# Patient Record
Sex: FEMALE | Race: BLACK OR AFRICAN AMERICAN | Employment: FULL TIME | URBAN - METROPOLITAN AREA
[De-identification: names, ages, dates, MRNs, and addresses within clinical notes are randomized per-mention and may not be internally consistent; named-entity substitution may affect disease eponyms.]

---

## 2017-07-13 PROBLEM — N92.1 MENOMETRORRHAGIA: Status: ACTIVE | Noted: 2017-07-13

## 2018-03-31 ENCOUNTER — HOSPITAL ENCOUNTER (EMERGENCY)
Age: 48
Discharge: HOME | End: 2018-03-31
Payer: COMMERCIAL

## 2018-03-31 VITALS
BODY MASS INDEX: 29.65 KG/M2 | TEMPERATURE: 98.6 F | RESPIRATION RATE: 16 BRPM | SYSTOLIC BLOOD PRESSURE: 114 MMHG | DIASTOLIC BLOOD PRESSURE: 82 MMHG | OXYGEN SATURATION: 98 % | WEIGHT: 195 LBS | HEART RATE: 100 BPM

## 2018-03-31 DIAGNOSIS — J06.9 ACUTE UPPER RESPIRATORY INFECTION: Primary | ICD-10-CM

## 2018-03-31 PROCEDURE — 99212 OFFICE O/P EST SF 10 MIN: CPT

## 2018-03-31 RX ORDER — BENZONATATE 100 MG/1
100 CAPSULE ORAL 3 TIMES DAILY PRN
Qty: 21 CAPSULE | Refills: 0 | Status: SHIPPED | OUTPATIENT
Start: 2018-03-31 | End: 2018-04-07

## 2018-03-31 RX ORDER — FLUTICASONE PROPIONATE 50 MCG
2 SPRAY, SUSPENSION (ML) NASAL DAILY
Qty: 1 BOTTLE | Refills: 0 | Status: SHIPPED | OUTPATIENT
Start: 2018-03-31 | End: 2021-05-17

## 2018-03-31 ASSESSMENT — PAIN DESCRIPTION - LOCATION: LOCATION: HEAD

## 2018-03-31 ASSESSMENT — PAIN DESCRIPTION - DESCRIPTORS: DESCRIPTORS: HEADACHE

## 2018-03-31 ASSESSMENT — PAIN SCALES - GENERAL: PAINLEVEL_OUTOF10: 2

## 2018-05-21 ENCOUNTER — HOSPITAL ENCOUNTER (OUTPATIENT)
Age: 48
Discharge: HOME | End: 2018-05-23
Payer: COMMERCIAL

## 2018-05-21 PROCEDURE — 80053 COMPREHEN METABOLIC PANEL: CPT

## 2018-05-21 PROCEDURE — 84443 ASSAY THYROID STIM HORMONE: CPT

## 2018-05-21 PROCEDURE — 85025 COMPLETE CBC W/AUTO DIFF WBC: CPT

## 2018-05-21 PROCEDURE — 84439 ASSAY OF FREE THYROXINE: CPT

## 2018-05-21 PROCEDURE — 82306 VITAMIN D 25 HYDROXY: CPT

## 2018-05-21 PROCEDURE — 80061 LIPID PANEL: CPT

## 2018-05-22 LAB
ALBUMIN SERPL-MCNC: 4.2 G/DL (ref 3.5–5.2)
ALP BLD-CCNC: 62 U/L (ref 35–104)
ALT SERPL-CCNC: 10 U/L (ref 0–32)
ANION GAP SERPL CALCULATED.3IONS-SCNC: 13 MMOL/L (ref 7–16)
AST SERPL-CCNC: 14 U/L (ref 0–31)
BASOPHILS ABSOLUTE: 0.04 E9/L (ref 0–0.2)
BASOPHILS RELATIVE PERCENT: 0.6 % (ref 0–2)
BILIRUB SERPL-MCNC: 0.3 MG/DL (ref 0–1.2)
BUN BLDV-MCNC: 16 MG/DL (ref 6–20)
CALCIUM SERPL-MCNC: 9.5 MG/DL (ref 8.6–10.2)
CHLORIDE BLD-SCNC: 104 MMOL/L (ref 98–107)
CHOLESTEROL, TOTAL: 125 MG/DL (ref 0–199)
CO2: 22 MMOL/L (ref 22–29)
CREAT SERPL-MCNC: 0.9 MG/DL (ref 0.5–1)
EOSINOPHILS ABSOLUTE: 0.24 E9/L (ref 0.05–0.5)
EOSINOPHILS RELATIVE PERCENT: 3.8 % (ref 0–6)
GFR AFRICAN AMERICAN: >60
GFR NON-AFRICAN AMERICAN: >60 ML/MIN/1.73
GLUCOSE BLD-MCNC: 87 MG/DL (ref 74–109)
HCT VFR BLD CALC: 39.6 % (ref 34–48)
HDLC SERPL-MCNC: 40 MG/DL
HEMOGLOBIN: 11.9 G/DL (ref 11.5–15.5)
IMMATURE GRANULOCYTES #: 0.02 E9/L
IMMATURE GRANULOCYTES %: 0.3 % (ref 0–5)
LDL CHOLESTEROL CALCULATED: 69 MG/DL (ref 0–99)
LYMPHOCYTES ABSOLUTE: 1.4 E9/L (ref 1.5–4)
LYMPHOCYTES RELATIVE PERCENT: 22.2 % (ref 20–42)
MCH RBC QN AUTO: 23.1 PG (ref 26–35)
MCHC RBC AUTO-ENTMCNC: 30.1 % (ref 32–34.5)
MCV RBC AUTO: 76.7 FL (ref 80–99.9)
MONOCYTES ABSOLUTE: 0.44 E9/L (ref 0.1–0.95)
MONOCYTES RELATIVE PERCENT: 7 % (ref 2–12)
NEUTROPHILS ABSOLUTE: 4.16 E9/L (ref 1.8–7.3)
NEUTROPHILS RELATIVE PERCENT: 66.1 % (ref 43–80)
PDW BLD-RTO: 13.2 FL (ref 11.5–15)
PLATELET # BLD: 303 E9/L (ref 130–450)
PMV BLD AUTO: 11.6 FL (ref 7–12)
POTASSIUM SERPL-SCNC: 4.1 MMOL/L (ref 3.5–5)
RBC # BLD: 5.16 E12/L (ref 3.5–5.5)
SODIUM BLD-SCNC: 139 MMOL/L (ref 132–146)
T4 FREE: 1.35 NG/DL (ref 0.93–1.7)
TOTAL PROTEIN: 6.9 G/DL (ref 6.4–8.3)
TRIGL SERPL-MCNC: 80 MG/DL (ref 0–149)
TSH SERPL DL<=0.05 MIU/L-ACNC: 1.96 UIU/ML (ref 0.27–4.2)
VITAMIN D 25-HYDROXY: 19 NG/ML (ref 30–100)
VLDLC SERPL CALC-MCNC: 16 MG/DL
WBC # BLD: 6.3 E9/L (ref 4.5–11.5)

## 2019-02-25 DIAGNOSIS — M25.561 RIGHT KNEE PAIN, UNSPECIFIED CHRONICITY: Primary | ICD-10-CM

## 2019-02-26 ENCOUNTER — OFFICE VISIT (OUTPATIENT)
Dept: ORTHOPEDIC SURGERY | Age: 49
End: 2019-02-26
Payer: COMMERCIAL

## 2019-02-26 VITALS — BODY MASS INDEX: 30.29 KG/M2 | WEIGHT: 193 LBS | TEMPERATURE: 98 F | HEIGHT: 67 IN

## 2019-02-26 DIAGNOSIS — M17.11 PRIMARY OSTEOARTHRITIS OF RIGHT KNEE: Primary | ICD-10-CM

## 2019-02-26 PROCEDURE — 99203 OFFICE O/P NEW LOW 30 MIN: CPT | Performed by: ORTHOPAEDIC SURGERY

## 2019-02-26 PROCEDURE — 20610 DRAIN/INJ JOINT/BURSA W/O US: CPT | Performed by: ORTHOPAEDIC SURGERY

## 2019-02-26 RX ORDER — TRIAMCINOLONE ACETONIDE 40 MG/ML
40 INJECTION, SUSPENSION INTRA-ARTICULAR; INTRAMUSCULAR ONCE
Status: COMPLETED | OUTPATIENT
Start: 2019-02-26 | End: 2019-02-26

## 2019-02-26 RX ADMIN — TRIAMCINOLONE ACETONIDE 40 MG: 40 INJECTION, SUSPENSION INTRA-ARTICULAR; INTRAMUSCULAR at 16:54

## 2019-05-01 ENCOUNTER — TELEPHONE (OUTPATIENT)
Dept: ORTHOPEDIC SURGERY | Age: 49
End: 2019-05-01

## 2019-05-01 RX ORDER — MELOXICAM 15 MG/1
15 TABLET ORAL DAILY
Qty: 30 TABLET | Refills: 3 | Status: SHIPPED
Start: 2019-05-01 | End: 2021-05-17 | Stop reason: SDUPTHER

## 2019-07-26 ENCOUNTER — HOSPITAL ENCOUNTER (OUTPATIENT)
Dept: MAMMOGRAPHY | Age: 49
Discharge: HOME | End: 2019-07-28
Payer: COMMERCIAL

## 2019-07-26 DIAGNOSIS — N64.52 NIPPLE DISCHARGE: ICD-10-CM

## 2019-07-26 PROCEDURE — G0279 TOMOSYNTHESIS, MAMMO: HCPCS

## 2019-07-26 PROCEDURE — 77066 DX MAMMO INCL CAD BI: CPT

## 2019-08-22 ENCOUNTER — HOSPITAL ENCOUNTER (OUTPATIENT)
Age: 49
Discharge: HOME | End: 2019-08-24
Payer: COMMERCIAL

## 2019-08-22 LAB
BASOPHILS ABSOLUTE: 0.02 E9/L (ref 0–0.2)
BASOPHILS RELATIVE PERCENT: 0.4 % (ref 0–2)
EOSINOPHILS ABSOLUTE: 0.23 E9/L (ref 0.05–0.5)
EOSINOPHILS RELATIVE PERCENT: 4.7 % (ref 0–6)
HCT VFR BLD CALC: 42.1 % (ref 34–48)
HEMOGLOBIN: 12.3 G/DL (ref 11.5–15.5)
IMMATURE GRANULOCYTES #: 0.01 E9/L
IMMATURE GRANULOCYTES %: 0.2 % (ref 0–5)
LYMPHOCYTES ABSOLUTE: 1.19 E9/L (ref 1.5–4)
LYMPHOCYTES RELATIVE PERCENT: 24.3 % (ref 20–42)
MCH RBC QN AUTO: 23.3 PG (ref 26–35)
MCHC RBC AUTO-ENTMCNC: 29.2 % (ref 32–34.5)
MCV RBC AUTO: 79.9 FL (ref 80–99.9)
MONOCYTES ABSOLUTE: 0.43 E9/L (ref 0.1–0.95)
MONOCYTES RELATIVE PERCENT: 8.8 % (ref 2–12)
NEUTROPHILS ABSOLUTE: 3.02 E9/L (ref 1.8–7.3)
NEUTROPHILS RELATIVE PERCENT: 61.6 % (ref 43–80)
PDW BLD-RTO: 12.8 FL (ref 11.5–15)
PLATELET # BLD: 317 E9/L (ref 130–450)
PMV BLD AUTO: 11.6 FL (ref 7–12)
RBC # BLD: 5.27 E12/L (ref 3.5–5.5)
WBC # BLD: 4.9 E9/L (ref 4.5–11.5)

## 2019-08-22 PROCEDURE — 84443 ASSAY THYROID STIM HORMONE: CPT

## 2019-08-22 PROCEDURE — 80061 LIPID PANEL: CPT

## 2019-08-22 PROCEDURE — 85025 COMPLETE CBC W/AUTO DIFF WBC: CPT

## 2019-08-22 PROCEDURE — 84439 ASSAY OF FREE THYROXINE: CPT

## 2019-08-22 PROCEDURE — 80053 COMPREHEN METABOLIC PANEL: CPT

## 2019-08-23 LAB
ALBUMIN SERPL-MCNC: 4.2 G/DL (ref 3.5–5.2)
ALP BLD-CCNC: 76 U/L (ref 35–104)
ALT SERPL-CCNC: 10 U/L (ref 0–32)
ANION GAP SERPL CALCULATED.3IONS-SCNC: 12 MMOL/L (ref 7–16)
AST SERPL-CCNC: 16 U/L (ref 0–31)
BILIRUB SERPL-MCNC: 0.3 MG/DL (ref 0–1.2)
BUN BLDV-MCNC: 13 MG/DL (ref 6–20)
CALCIUM SERPL-MCNC: 9.1 MG/DL (ref 8.6–10.2)
CHLORIDE BLD-SCNC: 105 MMOL/L (ref 98–107)
CHOLESTEROL, TOTAL: 144 MG/DL (ref 0–199)
CO2: 21 MMOL/L (ref 22–29)
CREAT SERPL-MCNC: 1 MG/DL (ref 0.5–1)
GFR AFRICAN AMERICAN: >60
GFR NON-AFRICAN AMERICAN: >60 ML/MIN/1.73
GLUCOSE BLD-MCNC: 90 MG/DL (ref 74–99)
HDLC SERPL-MCNC: 37 MG/DL
LDL CHOLESTEROL CALCULATED: 84 MG/DL (ref 0–99)
POTASSIUM SERPL-SCNC: 4.3 MMOL/L (ref 3.5–5)
SODIUM BLD-SCNC: 138 MMOL/L (ref 132–146)
T4 FREE: 1.48 NG/DL (ref 0.93–1.7)
TOTAL PROTEIN: 7.2 G/DL (ref 6.4–8.3)
TRIGL SERPL-MCNC: 113 MG/DL (ref 0–149)
TSH SERPL DL<=0.05 MIU/L-ACNC: 1.05 UIU/ML (ref 0.27–4.2)
VLDLC SERPL CALC-MCNC: 23 MG/DL

## 2020-01-17 ENCOUNTER — HOSPITAL ENCOUNTER (EMERGENCY)
Age: 50
Discharge: HOME | End: 2020-01-17
Payer: COMMERCIAL

## 2020-01-17 ENCOUNTER — APPOINTMENT (OUTPATIENT)
Dept: GENERAL RADIOLOGY | Age: 50
End: 2020-01-17
Payer: COMMERCIAL

## 2020-01-17 VITALS
HEART RATE: 68 BPM | WEIGHT: 200 LBS | DIASTOLIC BLOOD PRESSURE: 93 MMHG | TEMPERATURE: 98.1 F | SYSTOLIC BLOOD PRESSURE: 136 MMHG | RESPIRATION RATE: 16 BRPM | BODY MASS INDEX: 31.32 KG/M2 | OXYGEN SATURATION: 98 %

## 2020-01-17 PROCEDURE — 73590 X-RAY EXAM OF LOWER LEG: CPT

## 2020-01-17 PROCEDURE — L4350 ANKLE CONTROL ORTHO PRE OTS: HCPCS

## 2020-01-17 PROCEDURE — 99212 OFFICE O/P EST SF 10 MIN: CPT

## 2020-01-17 RX ORDER — IBUPROFEN 800 MG/1
800 TABLET ORAL EVERY 8 HOURS PRN
Qty: 21 TABLET | Refills: 0 | Status: SHIPPED | OUTPATIENT
Start: 2020-01-17 | End: 2021-05-17

## 2020-01-17 RX ORDER — OMEPRAZOLE 20 MG/1
20 CAPSULE, DELAYED RELEASE ORAL DAILY
COMMUNITY
End: 2021-05-17

## 2020-01-17 ASSESSMENT — PAIN DESCRIPTION - ORIENTATION: ORIENTATION: RIGHT

## 2020-01-17 ASSESSMENT — PAIN DESCRIPTION - LOCATION: LOCATION: ANKLE

## 2020-01-17 ASSESSMENT — PAIN SCALES - GENERAL: PAINLEVEL_OUTOF10: 6

## 2020-01-17 NOTE — ED PROVIDER NOTES
HPI: Lisandra Been 44 y.o. female with a past medical history of   Past Medical History:   Diagnosis Date    Arthritis     GERD (gastroesophageal reflux disease)     Hiatal hernia      presents status post right ankle injury. The patient states that the injury happened acutely. The history is obtained from the patient.  The mechanism of injury is apparent and was a result of missing a step and falling. Patient describes the pain as aching and pressure. Patient states the pain worsens on ambulation and with any weightbearing. Patient denies any distal neurologic symptoms including numbness, tingling, paralysis or coolness of the foot. No other reported injuries or other extremity tenderness or injuries.      Review of Systems:   Pertinent positives and negatives are stated within HPI, all other systems reviewed and are negative.             --------------------------------------------- PAST HISTORY ---------------------------------------------  Past Medical History:  has a past medical history of Arthritis, GERD (gastroesophageal reflux disease), and Hiatal hernia. Past Surgical History:  has a past surgical history that includes hernia repair; Cholecystectomy; Colonoscopy; Endoscopy, colon, diagnostic; and other surgical history (07/12/2017). Social History:  reports that she has never smoked. She does not have any smokeless tobacco history on file. She reports current alcohol use. She reports that she does not use drugs. Family History: family history is not on file. The patients home medications have been reviewed. Allergies: Patient has no known allergies. Physical exam:  Constitutional: The patient is comfortable, well appearing, non toxic. The patient is alert and oriented and conversant.     Head: The head is atraumatic and normocephalic.     Eyes: No discharge is present from the eyes.  The sclera are normal.     ENT: The oropharynx is normal. The mouth is normal to inspection.     Neck: Normal range of motion is achieved in the neck. .     Respiratory/chest: The chest is nontender. Breath sounds are normal. There is no respiratory distress.     Cardiovascular: Heart shows a regular rate and rhythm without murmurs, rubs or gallops.     Lower extremity exam: There is no obvious compartment syndrome. The knee evaluation shows that both knees have no deformity, no swelling, and no proximal fibular head tenderness. . The ankle evaluation shows normal tendon function, capillary refill less than 2 seconds, distal motor function which is intact, distal sensory function which is intact. The achilies tendon is intact. There is localized swelling and tenderness noted of the ankle along the lateral and medial malleolus. The foot evaluation shows no tenderness at the base of the fifth metatarsal. There are no lacerations or evidence of open fracture.      ------------------------- NURSING NOTES AND VITALS REVIEWED ---------------------------   The nursing notes within the ED encounter and vital signs as below have been reviewed by myself. BP (!) 136/93   Pulse 68   Temp 98.1 °F (36.7 °C) (Oral)   Resp 16   Wt 200 lb (90.7 kg)   LMP 07/07/2017   SpO2 98%   BMI 31.32 kg/m²   Oxygen Saturation Interpretation: Normal    The patients available past medical records and past encounters were reviewed. -------------------------------------------------- RESULTS -------------------------------------------------  I have personally reviewed all laboratory and imaging results for this patient. Results are listed below. LABS:  No results found for this visit on 01/17/20.     RADIOLOGY:  Interpreted by Radiologist.  XR TIBIA FIBULA RIGHT (2 VIEWS)   Final Result   No acute osseous involvement              ------------------------------ ED COURSE/MEDICAL DECISION MAKING----------------------  Medications - No data to display      ED COURSE:       Medical decision making: right ankle

## 2020-07-27 ENCOUNTER — OFFICE VISIT (OUTPATIENT)
Dept: ORTHOPEDIC SURGERY | Age: 50
End: 2020-07-27
Payer: COMMERCIAL

## 2020-07-27 VITALS — BODY MASS INDEX: 31.08 KG/M2 | WEIGHT: 198 LBS | HEIGHT: 67 IN | TEMPERATURE: 98 F

## 2020-07-27 PROCEDURE — 99213 OFFICE O/P EST LOW 20 MIN: CPT | Performed by: ORTHOPAEDIC SURGERY

## 2020-07-27 PROCEDURE — 20610 DRAIN/INJ JOINT/BURSA W/O US: CPT | Performed by: ORTHOPAEDIC SURGERY

## 2020-07-27 NOTE — PROGRESS NOTES
Chief Complaint   Patient presents with    Knee Pain     Right Knee, F/U       Subjective:     Patient ID: Kelsie Clemens is a 44 y.o. .  female    Knee Pain  Patient complains of right knee pain. This is evaluated as a personal injury. There was not a history of injury. The pain began 5 years ago. The pain is located medial. She describes  Her symptoms as aching. She has not experienced popping, clicking, locking, and giving way in the affected knee. The patient has had pain with kneeling, squating, and climbing stairs. Symptoms improve with rest. The symptoms are worse with activity. The knee has not given out or felt unstable. The patient cannot bend and straighten the knee fully. The patient is active in none. Treatment to date has been ice, heat, Tylenol, NSAID's, viscosupplementation, without significant relief. She had csi with good relief for a couple monthsThe patient is working. 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      Past Medical History:   Diagnosis Date    Arthritis     GERD (gastroesophageal reflux disease)     Hiatal hernia      Past Surgical History:   Procedure Laterality Date    CHOLECYSTECTOMY      COLONOSCOPY      ENDOSCOPY, COLON, DIAGNOSTIC      HERNIA REPAIR      OTHER SURGICAL HISTORY  07/12/2017    laparscopic total hysterectomy robotic assissted with bilateral salpingectomy and cystoscopy       Current Outpatient Medications:     omeprazole (PRILOSEC) 20 MG delayed release capsule, Take 20 mg by mouth daily, Disp: , Rfl:     fluticasone (FLONASE) 50 MCG/ACT nasal spray, 2 sprays by Nasal route daily, Disp: 1 Bottle, Rfl: 0    ibuprofen (IBU) 800 MG tablet, Take 1 tablet by mouth every 8 hours as needed for Pain, Disp: 21 tablet, Rfl: 0    meloxicam (MOBIC) 15 MG tablet, Take 1 tablet by mouth daily, Disp: 30 tablet, Rfl: 3  No Known Allergies  Social History     Socioeconomic History    Marital status:      Spouse name: Not on file    Number of children: Not on file    Years of education: Not on file    Highest education level: Not on file   Occupational History    Not on file   Social Needs    Financial resource strain: Not on file    Food insecurity     Worry: Not on file     Inability: Not on file    Transportation needs     Medical: Not on file     Non-medical: Not on file   Tobacco Use    Smoking status: Never Smoker   Substance and Sexual Activity    Alcohol use: Yes     Comment: occasional     Drug use: No    Sexual activity: Not on file   Lifestyle    Physical activity     Days per week: Not on file     Minutes per session: Not on file    Stress: Not on file   Relationships    Social connections     Talks on phone: Not on file     Gets together: Not on file     Attends Jewish service: Not on file     Active member of club or organization: Not on file     Attends meetings of clubs or organizations: Not on file     Relationship status: Not on file    Intimate partner violence     Fear of current or ex partner: Not on file     Emotionally abused: Not on file     Physically abused: Not on file     Forced sexual activity: Not on file   Other Topics Concern    Not on file   Social History Narrative    Not on file     No family history on file. REVIEW OF SYSTEMS:     General/Constitution:  (-)weight loss, (-)fever, (-)chills, (-)weakness. Skin: (-) rash,(-) psoriasis,(-) eczema, (-)skin cancer. Musculoskeletal: (-) fractures,  (-) dislocations,(-) collagen vascular disease, (-) fibromyalgia, (-) multiple sclerosis, (-) muscular dystrophy, (-) RSD,(-) joint pain (-)swelling, (-) joint pain,swelling. Neurologic: (-) epilepsy, (-)seizures,(-) brain tumor,(-) TIA, (-)stroke, (-)headaches, (-)Parkinson disease,(-) memory loss, (-) LOC. Cardiovascular: (-) Chest pain, (-) swelling in legs/feet, (-) SOB, (-) cramping in legs/feet with walking. Respiratory: (-) SOB, (-) Coughing, (-) night sweats.   GI: (-) nausea, (-) vomiting, (-) diarrhea, (-) blood in stool, (-) gastric ulcer. Psychiatric: (-) Depression, (-) Anxiety, (-) bipolar disease, (-) Alzheimer's Disease  Allergic/Immunologic: (-) allergies latex, (-) allergies metal, (-) skin sensitivity. Hematlogic: (-) anemia, (-) blood transfusion, (-) DVT/PE, (-) Clotting disorders    Subjective:    Constitution:  @VS    Psycihatric:  The patient is alert and oriented x 3, appears to be stated age and in no distress. Respiratory:  Respiratory effort is not labored. Patient is not gasping. Palpation of the chest reveals no tactile fremitus. Skin:  Upon inspection: the skin appears warm, dry and intact. There is  a previous scar over the affected area. There is any cellulitis, lymphedema or cutaneous lesions noted in the lower extremities. Upon palpation there is no induration noted. Neurologic:  Gait: antalgic; Motor exam of the lower extremities show ; quadriceps, hamstrings, foot dorsi and plantar flexors intact R.  5/5 and L. 5/5. Deep tendon reflexes are 2/4 at the knees and 2/4 at the ankles with strong extensor hallicus longus motor strength bilaterally. Sensory to both feet is intact to all sensory roots. Cardiovascular: The vascular exam is normal and is well perfused to distal extremities. Distal pulses DP/PT: R. 2+; L. 2+. There is cap refill noted less than two seconds in all digits. There is not edema of the bilateral lower extremities. There is not varicosities noted in the distal extremities. Lymph:  Upon palpation,  there is no lymphadenopathy noted in bilateral lower extremities. Musculoskeletal:  Gait: antalgic; examination of the nails and digits reveal no cyanosis or clubbing. Lumbar exam:  On visual inspection, there is not deformity of the spine. full range of motion, no tenderness, palpable spasm or pain on motion. Special tests: Straight Leg Raise negative, Leila test negative.       Hip exam:   Upon inspection, there is not deformity noted. Upon palpation there is not tenderness. ROM: is  full and symmetrical.   Strength: Hip Flexors 5/5; Hip Abductors 5/5; Hip Adduction 5/5. Knee exam:  Right knee exam shows;  range of motion of R. Knee is 0 to 120, and L. Knee is 0 to 120. The patient does have  pain on motion, effusion is mild, there is tenderness over the  medial region, there are not any masses, there is not ligamentous instability, there is  deformity noted. Knee exam: right positive for moderate crepitations, some mild tenderness laxity is not noted with  stress. There is not a popliteal cyst.    R. Knee:  Lachman's negative, Anterior Drawer negative, Posterior Drawer negative  Gama's negative, Thallasy  negative,   PF grind test negative, Apprehension test negative, Patellar J sign  negative  L. Knee:  Lachman's negative, Anterior Drawer negative, Posterior Drawer negative  Gama's negative, Thallasy  negative,   PF grind test negative, Apprehension test negative,  Patellar J sign  negative    Xray Exam:  PA and AP standing, with lateral and tangent views of the knee fail to   reveal any evidence of fracture, dislocation or pathology of the bones   of the knee.  The bones are of normal density. Complete loss of the   medial compartment of the right knee joint space with subchondral   sclerosis and spurring. Slight varus deformity. .       Radiographic findings reviewed with patient    Assessment:  Encounter Diagnoses   Name Primary?  Primary osteoarthritis of right knee Yes       Plan:  I had a lengthy discussion with the patient regarding their diagnosis. I explained treatment options including surgical vs non surgical treatment. I reviewed in detail the risks and benefits and outlined the procedure in detail with expected outcomes and possible complications. I also discussed non surgical treatment such as injections (CSI and visco supplementation), physical therapy, topical creams and NSAID's.  They have

## 2020-07-29 RX ORDER — TRIAMCINOLONE ACETONIDE 40 MG/ML
40 INJECTION, SUSPENSION INTRA-ARTICULAR; INTRAMUSCULAR ONCE
Status: COMPLETED | OUTPATIENT
Start: 2020-07-29 | End: 2020-07-29

## 2020-07-29 RX ADMIN — TRIAMCINOLONE ACETONIDE 40 MG: 40 INJECTION, SUSPENSION INTRA-ARTICULAR; INTRAMUSCULAR at 15:04

## 2021-05-14 DIAGNOSIS — M17.11 PRIMARY OSTEOARTHRITIS OF RIGHT KNEE: Primary | ICD-10-CM

## 2021-05-17 ENCOUNTER — OFFICE VISIT (OUTPATIENT)
Dept: ORTHOPEDIC SURGERY | Age: 51
End: 2021-05-17
Payer: COMMERCIAL

## 2021-05-17 VITALS — HEIGHT: 67 IN | BODY MASS INDEX: 31.08 KG/M2 | WEIGHT: 198 LBS

## 2021-05-17 DIAGNOSIS — M17.11 PRIMARY OSTEOARTHRITIS OF RIGHT KNEE: Primary | ICD-10-CM

## 2021-05-17 PROCEDURE — 20610 DRAIN/INJ JOINT/BURSA W/O US: CPT | Performed by: ORTHOPAEDIC SURGERY

## 2021-05-17 PROCEDURE — 99213 OFFICE O/P EST LOW 20 MIN: CPT | Performed by: ORTHOPAEDIC SURGERY

## 2021-05-17 RX ORDER — TRIAMCINOLONE ACETONIDE 40 MG/ML
40 INJECTION, SUSPENSION INTRA-ARTICULAR; INTRAMUSCULAR ONCE
Status: COMPLETED | OUTPATIENT
Start: 2021-05-17 | End: 2021-05-17

## 2021-05-17 RX ORDER — MELOXICAM 15 MG/1
15 TABLET ORAL DAILY
Qty: 30 TABLET | Refills: 3 | Status: ON HOLD
Start: 2021-05-17 | End: 2022-05-26 | Stop reason: HOSPADM

## 2021-05-17 RX ADMIN — TRIAMCINOLONE ACETONIDE 40 MG: 40 INJECTION, SUSPENSION INTRA-ARTICULAR; INTRAMUSCULAR at 12:34

## 2021-05-17 NOTE — PROGRESS NOTES
Chief Complaint   Patient presents with    Knee Pain     Right knee pain follow up. Anh Osorio returns today for follow-up of her right knee pain. she reports this is worse than when I saw her last.  The patient's pain level is a 7/10. The previous treatment was not successful. Past Medical History:   Diagnosis Date    Arthritis     GERD (gastroesophageal reflux disease)     Hiatal hernia      Past Surgical History:   Procedure Laterality Date    CHOLECYSTECTOMY      COLONOSCOPY      ENDOSCOPY, COLON, DIAGNOSTIC      HERNIA REPAIR      OTHER SURGICAL HISTORY  07/12/2017    laparscopic total hysterectomy robotic assissted with bilateral salpingectomy and cystoscopy       Current Outpatient Medications:     meloxicam (MOBIC) 15 MG tablet, Take 1 tablet by mouth daily, Disp: 30 tablet, Rfl: 3  No Known Allergies  Social History     Socioeconomic History    Marital status:      Spouse name: Not on file    Number of children: Not on file    Years of education: Not on file    Highest education level: Not on file   Occupational History    Not on file   Tobacco Use    Smoking status: Never Smoker   Substance and Sexual Activity    Alcohol use: Yes     Comment: occasional     Drug use: No    Sexual activity: Not on file   Other Topics Concern    Not on file   Social History Narrative    Not on file     Social Determinants of Health     Financial Resource Strain:     Difficulty of Paying Living Expenses:    Food Insecurity:     Worried About Running Out of Food in the Last Year:     Virgil of Food in the Last Year:    Transportation Needs:     Lack of Transportation (Medical):      Lack of Transportation (Non-Medical):    Physical Activity:     Days of Exercise per Week:     Minutes of Exercise per Session:    Stress:     Feeling of Stress :    Social Connections:     Frequency of Communication with Friends and Family:     Frequency of Social Gatherings with Friends and Family:     Attends Taoist Services:     Active Member of Clubs or Organizations:     Attends Club or Organization Meetings:     Marital Status:    Intimate Partner Violence:     Fear of Current or Ex-Partner:     Emotionally Abused:     Physically Abused:     Sexually Abused:      No family history on file. Review of Systems:     Skin: (-) rash,(-) psoriasis,(-) eczema, (-)skin cancer. Musculoskeletal: (-) fractures,  (-) dislocations,(-) collagen vascular disease, (-) fibromyalgia, (-) multiple sclerosis, (-) muscular dystrophy, (-) RSD,(-) joint pain (-)swelling, (-) joint pain,swelling. Neurologic: (-) epilepsy, (-)seizures,(-) brain tumor,(-) TIA, (-)stroke, (-)headaches, (-)Parkinson disease,(-) memory loss, (-) LOC. Cardiovascular: (-) Chest pain, (-) swelling in legs/feet, (-) SOB, (-) cramping in legs/feet with walking. Constitutional:  The patient is alert and oriented x 3, appears to be stated age and in no distress. Ht 5' 7\" (1.702 m)   Wt 198 lb (89.8 kg)   LMP 07/07/2017   BMI 31.01 kg/m²     Skin:  Upon inspection: the skin appears warm, dry and intact. There is not a previous scar over the affected area. There is not any cellulitis, lymphedema or cutaneous lesions noted in the lower extremities. Upon palpation there is no induration noted. Neurologic:  Gait: normal;  Motor exam of the lower extremities show ; quadriceps, hamstrings, foot dorsi and plantar flexors intact R.  5/5 and L. 5/5. Deep tendon reflexes are 2/4 at the knees and 2/4 at the ankles with strong extensor hallicus longus motor strength bilaterally. Sensory to both feet is intact to all sensory roots. Cardiovascular: The vascular exam is normal and is well perfused to distal extremities. Distal pulses DP/PT: R. 2+; L. 2+. There is cap refill noted less than two seconds in all digits. There is not edema of the bilateral lower extremities. There is not varicosities noted in the distal extremities. explained treatment options including surgical vs non surgical treatment. I reviewed in detail the risks and benefits and outlined the procedure in detail with expected outcomes and possible complications. I also discussed non surgical treatment such as injections (CSI and visco supplementation), physical therapy, topical creams and NSAID's. They have elected for conservative management at this time. I will proceed with a cortisone injection in the Right knee. Verbal and written consent was obtained for the injections. The skin was prepped with alcohol. 1mL of Kenalog 40mg and 9mL of 0.25% Marcaine was  injected to Right knee. The injection was given through the lateral side of the knee. The patient tolerated the injection well. I will see the patient back after zilretta approval   The patient has failed conservative measures such as NSAIDS, HEP, and cortisone injections. She is an excellent candidate for Zilretta injections  in the Right knee.  We will contact the patient's insurance company and see them back in the office once we have received approval.

## 2021-06-01 ENCOUNTER — TELEPHONE (OUTPATIENT)
Dept: ORTHOPEDIC SURGERY | Age: 51
End: 2021-06-01

## 2021-07-14 ENCOUNTER — CLINICAL DOCUMENTATION (OUTPATIENT)
Dept: GENERAL RADIOLOGY | Age: 51
End: 2021-07-14

## 2021-09-22 ENCOUNTER — HOSPITAL ENCOUNTER (OUTPATIENT)
Dept: GENERAL RADIOLOGY | Age: 51
Discharge: HOME | End: 2021-09-24
Payer: COMMERCIAL

## 2021-09-22 DIAGNOSIS — Z12.31 VISIT FOR SCREENING MAMMOGRAM: ICD-10-CM

## 2021-09-22 PROCEDURE — 77063 BREAST TOMOSYNTHESIS BI: CPT

## 2021-09-30 ENCOUNTER — NURSE ONLY (OUTPATIENT)
Dept: ORTHOPEDIC SURGERY | Age: 51
End: 2021-09-30
Payer: COMMERCIAL

## 2021-09-30 DIAGNOSIS — M17.11 PRIMARY OSTEOARTHRITIS OF RIGHT KNEE: Primary | ICD-10-CM

## 2021-09-30 PROCEDURE — 20610 DRAIN/INJ JOINT/BURSA W/O US: CPT | Performed by: NURSE PRACTITIONER

## 2021-09-30 PROCEDURE — 99999 PR OFFICE/OUTPT VISIT,PROCEDURE ONLY: CPT | Performed by: NURSE PRACTITIONER

## 2021-09-30 NOTE — PROGRESS NOTES
Chief Complaint   Patient presents with    Injections     right knee zilretta        I will proceed with a cortisone injection in the Right knee. Verbal and written consent was obtained for the injections. The skin was prepped with alcohol. A prepared mixture of 32 mg of Zilretta and 5mL diluent was injected to Right knee. The injection was given through the lateral side of the knee. The patient tolerated the injection well. I will see the patient back prn. Ruba Villagran was seen today for injections.     Diagnoses and all orders for this visit:    Primary osteoarthritis of right knee  -     20610 - OR DRAIN/INJECT LARGE JOINT/BURSA    Other orders  -     triamcinolone acetonide (ZILRETTA) intra-articular injection 32 mg

## 2021-10-28 ENCOUNTER — OFFICE VISIT (OUTPATIENT)
Dept: ORTHOPEDIC SURGERY | Age: 51
End: 2021-10-28
Payer: COMMERCIAL

## 2021-10-28 VITALS — TEMPERATURE: 98 F | HEIGHT: 67 IN | WEIGHT: 198 LBS | BODY MASS INDEX: 31.08 KG/M2

## 2021-10-28 DIAGNOSIS — M17.11 PRIMARY OSTEOARTHRITIS OF RIGHT KNEE: Primary | ICD-10-CM

## 2021-10-28 PROCEDURE — 99212 OFFICE O/P EST SF 10 MIN: CPT | Performed by: NURSE PRACTITIONER

## 2021-10-28 RX ORDER — ERGOCALCIFEROL 1.25 MG/1
CAPSULE ORAL
COMMUNITY
Start: 2021-10-18

## 2021-10-28 NOTE — PROGRESS NOTES
Stress:     Feeling of Stress :    Social Connections:     Frequency of Communication with Friends and Family:     Frequency of Social Gatherings with Friends and Family:     Attends Advent Services:     Active Member of Clubs or Organizations:     Attends Club or Organization Meetings:     Marital Status:    Intimate Partner Violence:     Fear of Current or Ex-Partner:     Emotionally Abused:     Physically Abused:     Sexually Abused:      No family history on file. Review of Systems:     Skin: (-) rash,(-) psoriasis,(-) eczema, (-)skin cancer. Musculoskeletal: (-) fractures,  (-) dislocations,(-) collagen vascular disease, (-) fibromyalgia, (-) multiple sclerosis, (-) muscular dystrophy, (-) RSD,(-) joint pain (-)swelling, (-) joint pain,swelling. Neurologic: (-) epilepsy, (-)seizures,(-) brain tumor,(-) TIA, (-)stroke, (-)headaches, (-)Parkinson disease,(-) memory loss, (-) LOC. Cardiovascular: (-) Chest pain, (-) swelling in legs/feet, (-) SOB, (-) cramping in legs/feet with walking. Constitutional:  The patient is alert and oriented x 3, appears to be stated age and in no distress. Temp 98 °F (36.7 °C)   Ht 5' 7\" (1.702 m)   Wt 198 lb (89.8 kg)   LMP 07/07/2017   BMI 31.01 kg/m²     Skin:  Upon inspection: the skin appears warm, dry and intact. There is not a previous scar over the affected area. There is not any cellulitis, lymphedema or cutaneous lesions noted in the lower extremities. Upon palpation there is no induration noted. Neurologic:  Gait: normal;  Motor exam of the lower extremities show ; quadriceps, hamstrings, foot dorsi and plantar flexors intact R.  5/5 and L. 5/5. Deep tendon reflexes are 2/4 at the knees and 2/4 at the ankles with strong extensor hallicus longus motor strength bilaterally. Sensory to both feet is intact to all sensory roots. Cardiovascular: The vascular exam is normal and is well perfused to distal extremities.   Distal pulses DP/PT: compression, and elevation (RICE) therapy. Reduction in offending activity. I had a lengthy discussion with the patient regarding their diagnosis. I explained treatment options including surgical vs non surgical treatment. I reviewed in detail the risks and benefits and outlined the procedure in detail with expected outcomes and possible complications. I also discussed non surgical treatment such as injections (CSI and visco supplementation), physical therapy, topical creams and NSAID's. They have elected for conservative management at this time.    Fu prn

## 2022-04-05 ENCOUNTER — OFFICE VISIT (OUTPATIENT)
Dept: ORTHOPEDIC SURGERY | Age: 52
End: 2022-04-05
Payer: COMMERCIAL

## 2022-04-05 VITALS — WEIGHT: 195 LBS | HEIGHT: 67 IN | BODY MASS INDEX: 30.61 KG/M2

## 2022-04-05 DIAGNOSIS — M17.11 PRIMARY OSTEOARTHRITIS OF RIGHT KNEE: Primary | ICD-10-CM

## 2022-04-05 PROCEDURE — 99214 OFFICE O/P EST MOD 30 MIN: CPT | Performed by: ORTHOPAEDIC SURGERY

## 2022-04-05 NOTE — PROGRESS NOTES
Chief Complaint   Patient presents with    Knee Pain     Right knee pain follow up. Nadia Howard returns today for follow-up of her right knee pain. she reports this is worse than when I saw her last.  The patient's pain level is a 7/10. The previous treatment was successful. Past Medical History:   Diagnosis Date    Arthritis     GERD (gastroesophageal reflux disease)     Hiatal hernia      Past Surgical History:   Procedure Laterality Date    CHOLECYSTECTOMY      COLONOSCOPY      ENDOSCOPY, COLON, DIAGNOSTIC      HERNIA REPAIR      OTHER SURGICAL HISTORY  07/12/2017    laparscopic total hysterectomy robotic assissted with bilateral salpingectomy and cystoscopy       Current Outpatient Medications:     vitamin D (ERGOCALCIFEROL) 1.25 MG (98445 UT) CAPS capsule, , Disp: , Rfl:     meloxicam (MOBIC) 15 MG tablet, Take 1 tablet by mouth daily, Disp: 30 tablet, Rfl: 3  No Known Allergies  Social History     Socioeconomic History    Marital status:      Spouse name: Not on file    Number of children: Not on file    Years of education: Not on file    Highest education level: Not on file   Occupational History    Not on file   Tobacco Use    Smoking status: Never Smoker    Smokeless tobacco: Not on file   Substance and Sexual Activity    Alcohol use: Yes     Comment: occasional     Drug use: No    Sexual activity: Not on file   Other Topics Concern    Not on file   Social History Narrative    Not on file     Social Determinants of Health     Financial Resource Strain:     Difficulty of Paying Living Expenses: Not on file   Food Insecurity:     Worried About Running Out of Food in the Last Year: Not on file    Virgil of Food in the Last Year: Not on file   Transportation Needs:     Lack of Transportation (Medical): Not on file    Lack of Transportation (Non-Medical):  Not on file   Physical Activity:     Days of Exercise per Week: Not on file    Minutes of Exercise per Session: Not on file   Stress:     Feeling of Stress : Not on file   Social Connections:     Frequency of Communication with Friends and Family: Not on file    Frequency of Social Gatherings with Friends and Family: Not on file    Attends Christianity Services: Not on file    Active Member of Clubs or Organizations: Not on file    Attends Club or Organization Meetings: Not on file    Marital Status: Not on file   Intimate Partner Violence:     Fear of Current or Ex-Partner: Not on file    Emotionally Abused: Not on file    Physically Abused: Not on file    Sexually Abused: Not on file   Housing Stability:     Unable to Pay for Housing in the Last Year: Not on file    Number of [place] in the Last Year: Not on file    Unstable Housing in the Last Year: Not on file     No family history on file. Review of Systems:     Skin: (-) rash,(-) psoriasis,(-) eczema, (-)skin cancer. Musculoskeletal: (-) fractures,  (-) dislocations,(-) collagen vascular disease, (-) fibromyalgia, (-) multiple sclerosis, (-) muscular dystrophy, (-) RSD,(-) joint pain (-)swelling, (-) joint pain,swelling. Neurologic: (-) epilepsy, (-)seizures,(-) brain tumor,(-) TIA, (-)stroke, (-)headaches, (-)Parkinson disease,(-) memory loss, (-) LOC. Cardiovascular: (-) Chest pain, (-) swelling in legs/feet, (-) SOB, (-) cramping in legs/feet with walking. Constitutional:  The patient is alert and oriented x 3, appears to be stated age and in no distress. Ht 5' 7\" (1.702 m)   Wt 195 lb (88.5 kg)   LMP 07/07/2017   BMI 30.54 kg/m²     Skin:  Upon inspection: the skin appears warm, dry and intact. There is not a previous scar over the affected area. There is not any cellulitis, lymphedema or cutaneous lesions noted in the lower extremities. Upon palpation there is no induration noted.       Neurologic:  Gait: normal;  Motor exam of the lower extremities show ; quadriceps, hamstrings, foot dorsi and plantar flexors intact R.  5/5 and L. 5/5. Deep tendon reflexes are 2/4 at the knees and 2/4 at the ankles with strong extensor hallicus longus motor strength bilaterally. Sensory to both feet is intact to all sensory roots. Cardiovascular: The vascular exam is normal and is well perfused to distal extremities. Distal pulses DP/PT: R. 2+; L. 2+. There is cap refill noted less than two seconds in all digits. There is not edema of the bilateral lower extremities. There is not varicosities noted in the distal extremities. Lymph:  Upon palpation,  there is no lymphadenopathy noted in bilateral lower extremities. Musculoskeletal:  Gait: normal; examination of the nails and digits reveal no cyanosis or clubbing    Lumbar exam:  On visual inspection, there is no deformity of the spine. full range of motion, no tenderness, palpable spasm or pain on motion. Special tests: Straight Leg Raise negative, Leila testnegative. Hip exam:  Upon inspection, there is no deformity noted. Upon palpation there is not tenderness. ROM: is   full and semetrical.   Strength: Hip Flexors 5/5; Hip Abductors 5/5; Hip Adduction 5/5. Knee exam:  Right knee exam shows;  range of motion of R. Knee is 0 to 120, and L. Knee is 0 to 120. She does have  pain on motion, effusion is mild, there is tenderness over the  medial, anterior region, there are not any masses, there is not ligamentous instability, there is  deformity noted. Knee exam: right positive for moderate crepitations, some mild tenderness laxity is not noted with  stress.       R. Knee:  Lachman's negative, Anterior Drawer negative, Posterior Drawer negative  Gama's positive, Thallasy  positive,   PF grind test positive, Apprehension test negative, Patellar J sign  negative  L. Knee:  Lachman's negative, Anterior Drawer negative, Posterior Drawer negative  Gama's negative, Thallasy  negative,   PF grind test negative, Apprehension test negative,  Patellar J sign  negative    Xrays: There is stable medial compartment joint space narrowing, with near complete   joint space loss with weight-bearing.  There are small medial compartment and   patellofemoral compartment osteophytes.  No fracture or dislocation is seen. No joint effusion.  No focal osseous lesions. MRI:   n/a  Radiographic findings reviewed with patient    Impression:  No diagnosis found. Plan:   Natural history and expected course discussed. Questions answered. Educational materials distributed. Rest, ice, compression, and elevation (RICE) therapy. Reduction in offending activity. I had a lengthy discussion with the patient regarding their diagnosis. I explained treatment options including surgical vs non surgical treatment. I reviewed in detail the risks and benefits and outlined the procedure in detail with expected outcomes and possible complications. I also discussed non surgical treatment such as injections (CSI and visco supplementation), physical therapy, topical creams and NSAID's. They have elected for surgical management at this time. We discussed various treatment options both surgical and non-surgical.   The patient is unable to ambulate more than 100 feet and is unable to perform the average daily activities including:  Light housework, ADLs, donning clothes, toileting and exercise. Patient has failed previous conservative measures including cortisone injections, NSAIDs, PT, HEP and pain medication and is currently a fall risk to the disability and decreased functioning. The patient wishes to have the total knee arthroplasty. The risks and benefits of a total knee replacement were discussed with the patient.   The risks include but are not limited to: infection, injury to blood vessels and nerves, non relief of symptoms, arthrofibrosis of knee, aseptic loosening of prosthesis, intraoperative fracture, blood loss, PE/DVT, MI, dislocation of hip and knee, need for further operative intervention and death.  The patient is aware of the risks and wished to proceed with a Right total knee replacement , she will call with a date, tentative for 5/26/22. We will obtain medical clearence from the PCP. At least 30 minutes was spent discussing the diagnosis and treatment options with the patient with at least 50% of the time was spent with decision making and counseling the patient. The patient was counseled at length about the risks of delma Covid-19 during their perioperative period and any recovery window from their procedure. The patient was made aware that delma Covid-19  may worsen their prognosis for recovering from their procedure  and lend to a higher morbidity and/or mortality risk. All material risks, benefits, and reasonable alternatives including postponing the procedure were discussed. The patient does wish to proceed with the procedure at this time.

## 2022-05-16 ENCOUNTER — TELEPHONE (OUTPATIENT)
Dept: ADMINISTRATIVE | Age: 52
End: 2022-05-16

## 2022-05-16 NOTE — TELEPHONE ENCOUNTER
Pt has some questions about her upcoming surgery and would like to speak with Meena Jasso or anyone in the clinical staff.

## 2022-05-17 ENCOUNTER — TELEPHONE (OUTPATIENT)
Dept: ORTHOPEDIC SURGERY | Age: 52
End: 2022-05-17

## 2022-05-17 NOTE — TELEPHONE ENCOUNTER
Prior Authorization Form:      DEMOGRAPHICS:                     Patient Name:  Rc Nash  Patient :  1970            Insurance:  Payor: [facility] / Plan: [address] [place] [address] / Product Type: *No Product type* /   Insurance ID Number:    Payor/Plan Subscr  Sex Relation Sub. Ins. ID Effective Group Num   1. [address] 1972 Male Spouse [id number] 3/1/18 [id number]                                   [id number]         DIAGNOSIS & PROCEDURE:                       Procedure/Operation: right total knee arthroplasty           CPT Code: 37938    Diagnosis:  Right knee DJD    ICD10 Code: M17.11    Location:  [facility]    Surgeon:   Carlton Reyes    SCHEDULING INFORMATION:                          Date: 22    Time: 12pm              Anesthesia:  Spinal                                                       Status:  Outpatient        Special Comments:  Reyes Quan and Nephew- Shayne Penta       Electronically signed by Sy Feng ATC on 2022 at 10:35 AM

## 2022-05-19 ENCOUNTER — HOSPITAL ENCOUNTER (OUTPATIENT)
Dept: PREADMISSION TESTING | Age: 52
Discharge: HOME | End: 2022-05-19
Payer: COMMERCIAL

## 2022-05-19 ENCOUNTER — HOSPITAL ENCOUNTER (OUTPATIENT)
Dept: GENERAL RADIOLOGY | Age: 52
Discharge: HOME | End: 2022-05-21
Payer: COMMERCIAL

## 2022-05-19 ENCOUNTER — ANESTHESIA EVENT (OUTPATIENT)
Dept: OPERATING ROOM | Age: 52
End: 2022-05-19
Payer: COMMERCIAL

## 2022-05-19 VITALS
RESPIRATION RATE: 16 BRPM | SYSTOLIC BLOOD PRESSURE: 118 MMHG | WEIGHT: 195.7 LBS | DIASTOLIC BLOOD PRESSURE: 80 MMHG | OXYGEN SATURATION: 98 % | TEMPERATURE: 97.3 F | HEIGHT: 68 IN | HEART RATE: 112 BPM | BODY MASS INDEX: 29.66 KG/M2

## 2022-05-19 DIAGNOSIS — M17.11 PRIMARY OSTEOARTHRITIS OF RIGHT KNEE: ICD-10-CM

## 2022-05-19 LAB
APTT: 31.6 SEC (ref 24.5–35.1)
BILIRUBIN URINE: NEGATIVE
BLOOD, URINE: NEGATIVE
CLARITY: CLEAR
COLOR: YELLOW
GLUCOSE URINE: NEGATIVE MG/DL
HBA1C MFR BLD: 4.5 % (ref 4–5.6)
INR BLD: 1.3
KETONES, URINE: NEGATIVE MG/DL
LEUKOCYTE ESTERASE, URINE: NEGATIVE
NITRITE, URINE: NEGATIVE
PH UA: 5 (ref 5–9)
PREALBUMIN: 29 MG/DL (ref 20–40)
PROTEIN UA: NEGATIVE MG/DL
PROTHROMBIN TIME: 15.1 SEC (ref 9.3–12.4)
SPECIFIC GRAVITY UA: >=1.03 (ref 1–1.03)
UROBILINOGEN, URINE: 0.2 E.U./DL

## 2022-05-19 PROCEDURE — 87088 URINE BACTERIA CULTURE: CPT

## 2022-05-19 PROCEDURE — 83036 HEMOGLOBIN GLYCOSYLATED A1C: CPT

## 2022-05-19 PROCEDURE — 85730 THROMBOPLASTIN TIME PARTIAL: CPT

## 2022-05-19 PROCEDURE — 81003 URINALYSIS AUTO W/O SCOPE: CPT

## 2022-05-19 PROCEDURE — 71046 X-RAY EXAM CHEST 2 VIEWS: CPT

## 2022-05-19 PROCEDURE — 87081 CULTURE SCREEN ONLY: CPT

## 2022-05-19 PROCEDURE — 85610 PROTHROMBIN TIME: CPT

## 2022-05-19 PROCEDURE — 36415 COLL VENOUS BLD VENIPUNCTURE: CPT

## 2022-05-19 PROCEDURE — 84134 ASSAY OF PREALBUMIN: CPT

## 2022-05-19 RX ORDER — SODIUM CHLORIDE, SODIUM LACTATE, POTASSIUM CHLORIDE, CALCIUM CHLORIDE 600; 310; 30; 20 MG/100ML; MG/100ML; MG/100ML; MG/100ML
INJECTION, SOLUTION INTRAVENOUS CONTINUOUS
Status: CANCELLED | OUTPATIENT
Start: 2022-05-19

## 2022-05-19 RX ORDER — FENTANYL CITRATE 50 UG/ML
50 INJECTION, SOLUTION INTRAMUSCULAR; INTRAVENOUS EVERY 5 MIN PRN
Status: CANCELLED | OUTPATIENT
Start: 2022-05-19

## 2022-05-19 RX ORDER — MIDAZOLAM HYDROCHLORIDE 1 MG/ML
1 INJECTION INTRAMUSCULAR; INTRAVENOUS EVERY 5 MIN PRN
Status: CANCELLED | OUTPATIENT
Start: 2022-05-19

## 2022-05-19 RX ORDER — ROPIVACAINE HYDROCHLORIDE 5 MG/ML
30 INJECTION, SOLUTION EPIDURAL; INFILTRATION; PERINEURAL ONCE
Status: CANCELLED | OUTPATIENT
Start: 2022-05-19 | End: 2022-05-19

## 2022-05-19 ASSESSMENT — PAIN DESCRIPTION - ORIENTATION: ORIENTATION: RIGHT

## 2022-05-19 ASSESSMENT — LIFESTYLE VARIABLES: SMOKING_STATUS: 0

## 2022-05-19 ASSESSMENT — PAIN DESCRIPTION - LOCATION: LOCATION: KNEE

## 2022-05-19 ASSESSMENT — PAIN DESCRIPTION - DESCRIPTORS: DESCRIPTORS: ACHING;SORE

## 2022-05-19 ASSESSMENT — PAIN SCALES - GENERAL: PAINLEVEL_OUTOF10: 2

## 2022-05-19 NOTE — ANESTHESIA PRE PROCEDURE
Department of Anesthesiology  Preprocedure Note       Name:  Dania Ball   Age:  45 y.o.  :  1970                                          MRN:  [id number]         Date:  2022      Surgeon: Jered Saavedra): Daphne Finch DO    Procedure: Procedure(s):  RIGHT KNEE TOTAL ARTHROPLASTY [facility])    Medications prior to admission:   Prior to Admission medications    Medication Sig Start Date End Date Taking? Authorizing Provider   NONFORMULARY Vitamin A, D, E, C.     Historical Provider, MD   vitamin D (ERGOCALCIFEROL) 1.25 MG (88470 UT) CAPS capsule  10/18/21   Historical Provider, MD   meloxicam (MOBIC) 15 MG tablet Take 1 tablet by mouth daily 21  Daphne Finch DO       Current medications:    Current Facility-Administered Medications   Medication Dose Route Frequency Provider Last Rate Last Admin    ortho mix injection   Injection On Call Erleen Gains, APRN - CNP        acetaminophen (TYLENOL) tablet 1,000 mg  1,000 mg Oral Once Erleen Gains, APRN - CNP        celecoxib (CELEBREX) capsule 200 mg  200 mg Oral Once Erleen Gains, APRN - CNP        dexamethasone (DECADRON) injection 8 mg  8 mg IntraVENous Once Erleen Gains, APRN - CNP        sodium chloride flush 0.9 % injection 5-40 mL  5-40 mL IntraVENous 2 times per day Erleen Gains, APRN - CNP        sodium chloride flush 0.9 % injection 5-40 mL  5-40 mL IntraVENous PRN Erleen Gains, APRN - CNP        0.9 % sodium chloride infusion   IntraVENous PRN Erleen Gains, APRN - CNP        ceFAZolin (ANCEF) 2000 mg in dextrose 3 % 50 mL IVPB (duplex)  2,000 mg IntraVENous On Call to [facility], APRN - CNP        scopolamine (TRANSDERM-SCOP) transdermal patch 1 patch  1 patch TransDERmal Q72H Daphne Finch DO           Allergies:  No Known Allergies    Problem List:    Patient Active Problem List   Diagnosis Code    DJD (degenerative joint disease) of knee M17.10    Knee pain M25.569    Menometrorrhagia N92.1    Primary osteoarthritis of right knee M17.11       Past Medical History:        Diagnosis Date    Arthritis     GERD (gastroesophageal reflux disease)     Hiatal hernia        Past Surgical History:        Procedure Laterality Date    ABDOMINOPLASTY  02/2017    CHOLECYSTECTOMY      COLONOSCOPY      ENDOSCOPY, COLON, DIAGNOSTIC      HERNIA REPAIR      HYSTERECTOMY      OTHER SURGICAL HISTORY  07/12/2017    laparscopic total hysterectomy robotic assissted with bilateral salpingectomy and cystoscopy       Social History:    Social History     Tobacco Use    Smoking status: Never Smoker    Smokeless tobacco: Never Used   Substance Use Topics    Alcohol use: Yes     Comment: occasional                                 Counseling given: Not Answered      Vital Signs (Current): There were no vitals filed for this visit.                                            BP Readings from Last 3 Encounters:   05/19/22 118/80   01/17/20 (!) 136/93   03/31/18 114/82       NPO Status:                                                                                 BMI:   Wt Readings from Last 3 Encounters:   05/19/22 195 lb 11.2 oz (88.8 kg)   04/05/22 195 lb (88.5 kg)   10/28/21 198 lb (89.8 kg)     There is no height or weight on file to calculate BMI.    CBC:   Lab Results   Component Value Date    WBC 10.1 07/07/2021    RBC 5.39 07/07/2021    HGB 12.4 07/07/2021    HCT 41.5 07/07/2021    MCV 77.0 07/07/2021    RDW 13.1 07/07/2021     07/07/2021       CMP:   Lab Results   Component Value Date     07/07/2021    K 4.2 07/07/2021     07/07/2021    CO2 19 07/07/2021    BUN 10 07/07/2021    CREATININE 0.9 07/07/2021    GFRAA >60 07/07/2021    LABGLOM >60 07/07/2021    GLUCOSE 92 07/07/2021    GLUCOSE 67 12/06/2011    PROT 7.5 07/07/2021    CALCIUM 9.8 07/07/2021    BILITOT 0.6 07/07/2021    ALKPHOS 91 07/07/2021    AST 13 07/07/2021    ALT 9 07/07/2021       POC Tests: No results for input(s): POCGLU, POCNA, POCK, POCCL, POCBUN, POCHEMO, POCHCT in the last 72 hours. Coags:   Lab Results   Component Value Date    PROTIME 15.1 05/19/2022    INR 1.3 05/19/2022    APTT 31.6 05/19/2022       HCG (If Applicable): No results found for: PREGTESTUR, PREGSERUM, HCG, HCGQUANT     ABGs: No results found for: PHART, PO2ART, ZBQ2UIS, GSW4RFX, BEART, F8NPXFHI     Type & Screen (If Applicable):  No results found for: LABABO, LABRH    Drug/Infectious Status (If Applicable):  No results found for: HIV, HEPCAB    COVID-19 Screening (If Applicable): No results found for: COVID19        Anesthesia Evaluation  Patient summary reviewed no history of anesthetic complications:   Airway: Mallampati: I  TM distance: >3 FB   Neck ROM: full  Mouth opening: > = 3 FB Dental: normal exam         Pulmonary: breath sounds clear to auscultation      (-) not a current smoker                           Cardiovascular:Negative CV ROS            Rhythm: regular  Rate: normal                    Neuro/Psych:   Negative Neuro/Psych ROS              GI/Hepatic/Renal:   (+) hiatal hernia, GERD:,           Endo/Other:    (+) : arthritis: OA., .                 Abdominal:             Vascular: negative vascular ROS. Other Findings:           Anesthesia Plan      spinal     ASA 2     (Right  Adductor canal block)  Induction: intravenous. MIPS: Postoperative opioids intended and Prophylactic antiemetics administered. Anesthetic plan and risks discussed with patient. Post-op pain plan if not by surgeon: single peripheral nerve block      PAT Patient Interview:  Patient seen, chart reviewed per routine on May 19, 2022 at 12:35 PM by Ruth Morris MD.  Above represents information available via shared medical record including previous anesthesia history, drug and allergy history.   Confirmation of above and final plan per Day of Surgery (DOS) anesthesiologist.    DOS STAFF ADDENDUM:    Pt seen and examined, chart reviewed (including anesthesia, drug and allergy history). Anesthetic plan, risks, benefits, alternatives, and personnel involved discussed with patient. Patient verbalized an understanding and agrees to proceed. Plan discussed with care team members and agreed upon.     Ellie Lucero MD  Staff Anesthesiologist  11:02 AM  Ellie Lucero MD   5/26/2022

## 2022-05-19 NOTE — PROGRESS NOTES
[facility]                                                                                                                    PRE OP INSTRUCTIONS FOR  Johanne Vaca        Date: 5/19/2022    Date of surgery: 5/26/22   Arrival Time: Hospital will call you between 5pm and 7pm the evening before with your final arrival time for surgery    1. Nothing by mouth (NPO) as instructed._________Nothing to eat or drink after midnight___________________________________________________________    2. Take the following medications with a small sip of water on the morning of Surgery: none     3. Diabetics may take evening dose of insulin but none after midnight. If you feel symptomatic or low blood sugar morning of surgery drink 1-2 ounces of apple juice only. 4. Aspirin, Ibuprofen, Advil, Naproxen, Vitamin E and other Anti-inflammatory products should be stopped  before surgery  as directed by your physician. Take Tylenol only unless instructed otherwise by your surgeon. 5. Check with your Doctor regarding stopping Plavix, Coumadin, Lovenox, Eliquis, Effient, or other blood thinners. 6. Do not smoke,use illicit drugs and do not drink any alcoholic beverages 24 hours prior to surgery. 7. You may brush your teeth the morning of surgery. DO NOT SWALLOW WATER    8. You MUST make arrangements for a responsible adult to take you home after your surgery. You will not be allowed to leave alone or drive yourself home. It is strongly suggested someone stay with you the first 24 hrs. Your surgery will be cancelled if you do not have a ride home. 9. PEDIATRIC PATIENTS ONLY:  A parent/legal guardian must accompany a child scheduled for surgery and plan to stay at the hospital until the child is discharged. Please do not bring other children with you.     10. Please wear simple, loose fitting clothing to the hospital.  Do not bring valuables (money, credit cards, checkbooks, etc.) Do not wear any makeup (including no eye makeup) or nail polish on your fingers or toes. 11. DO NOT wear any jewelry or piercings on day of surgery. All body piercing jewelry must be removed. 12. Shower the night before surgery with _x__Antibacterial soap /RENÉE WIPES___x_____    13. TOTAL JOINT REPLACEMENT/HYSTERECTOMY PATIENTS ONLY---Remember to bring Blood Bank bracelet to the hospital on the day of surgery. 14. If you have a Living Will and Durable Power of  for Healthcare, please bring in a copy. 15. If appropriate bring crutches, inspirex, WALKER, CANE etc... 12. Notify your Surgeon if you develop any illness between now and surgery time, cough, cold, fever, sore throat, nausea, vomiting, etc.  Please notify your surgeon if you experience dizziness, shortness of breath or blurred vision between now & the time of your surgery. 17. If you have ___dentures, they will be removed before going to the OR; we will provide you a container. If you wear ___contact lenses or ___glasses, they will be removed; please bring a case for them. 18. To provide excellent care visitors will be limited to 2 in the room at any given time. 19. Please bring picture ID and insurance card. 20. During flu season no children under the age of 15 are permitted in the hospital for the safety of all patients. 21. Other please check in at the information desk/main lobby. Please wear a mask. Please call AMBULATORY CARE if you have any further questions.    [address] [facility]     [address]

## 2022-05-21 LAB
MRSA CULTURE ONLY: NORMAL
URINE CULTURE, ROUTINE: NORMAL

## 2022-05-24 NOTE — CARE COORDINATION
05/24/22 0950   Social/Functional History   Lives With Spouse   Home Layout One level   Home Access Stairs to enter without rails   Entrance Stairs - Number of Steps 1-3 steps to enter   [place] Walk-in shower   Receives Help From Family   Condition of Participation: Discharge Planning   The Plan for Transition of Care is related to the following treatment goals: HHC/DME   The Patient and/or Patient Representative was provided with a Choice of Provider? Patient   The Patient and/Or Patient Representative agree with the Discharge Plan? Yes   Freedom of Choice list was provided with basic dialogue that supports the patient's individualized plan of care/goals, treatment preferences, and shares the quality data associated with the providers? Yes   5/24/2022: SS Note/Discharge planning:  Met with pt in PAT, pt having surgery for a total knee arthroplasty on 5/26/2022 , explained sw role for transition of care and reviewed rehab options and providers for [employer], pt relays no past [facility], pt plans to return home day of surgery from [facility], pt's  will help her and provide her transport home, pt prefers [employer], [facility], pt declined shower seat, sw will follow post-op for HHC/DME orders and to confirm pt's d/c plan. Electronically signed by LISETTE Romo on 5/24/2022 at 9:49 AM

## 2022-05-26 ENCOUNTER — ANESTHESIA (OUTPATIENT)
Dept: OPERATING ROOM | Age: 52
End: 2022-05-26
Payer: COMMERCIAL

## 2022-05-26 ENCOUNTER — HOSPITAL ENCOUNTER (OUTPATIENT)
Age: 52
Setting detail: OUTPATIENT SURGERY
Discharge: HOME | End: 2022-05-26
Attending: ORTHOPAEDIC SURGERY | Admitting: ORTHOPAEDIC SURGERY
Payer: COMMERCIAL

## 2022-05-26 ENCOUNTER — APPOINTMENT (OUTPATIENT)
Dept: GENERAL RADIOLOGY | Age: 52
End: 2022-05-26
Attending: ORTHOPAEDIC SURGERY
Payer: COMMERCIAL

## 2022-05-26 VITALS
DIASTOLIC BLOOD PRESSURE: 65 MMHG | HEART RATE: 76 BPM | OXYGEN SATURATION: 96 % | RESPIRATION RATE: 16 BRPM | HEIGHT: 68 IN | WEIGHT: 195.6 LBS | BODY MASS INDEX: 29.64 KG/M2 | TEMPERATURE: 98.3 F | SYSTOLIC BLOOD PRESSURE: 118 MMHG

## 2022-05-26 DIAGNOSIS — M17.11 PRIMARY OSTEOARTHRITIS OF RIGHT KNEE: Primary | ICD-10-CM

## 2022-05-26 DIAGNOSIS — Z96.651 S/P TOTAL KNEE ARTHROPLASTY, RIGHT: ICD-10-CM

## 2022-05-26 DIAGNOSIS — M24.9 DEGENERATION OF CARTILAGE IN A JOINT: ICD-10-CM

## 2022-05-26 PROCEDURE — 7100000000 HC PACU RECOVERY - FIRST 15 MIN: Performed by: ORTHOPAEDIC SURGERY

## 2022-05-26 PROCEDURE — 27447 TOTAL KNEE ARTHROPLASTY: CPT | Performed by: ORTHOPAEDIC SURGERY

## 2022-05-26 PROCEDURE — 6360000002 HC RX W HCPCS: Performed by: ANESTHESIOLOGY

## 2022-05-26 PROCEDURE — 2580000003 HC RX 258: Performed by: NURSE ANESTHETIST, CERTIFIED REGISTERED

## 2022-05-26 PROCEDURE — 64447 NJX AA&/STRD FEMORAL NRV IMG: CPT | Performed by: ANESTHESIOLOGY

## 2022-05-26 PROCEDURE — 7100000010 HC PHASE II RECOVERY - FIRST 15 MIN: Performed by: ORTHOPAEDIC SURGERY

## 2022-05-26 PROCEDURE — 2580000003 HC RX 258: Performed by: ANESTHESIOLOGY

## 2022-05-26 PROCEDURE — 3600000015 HC SURGERY LEVEL 5 ADDTL 15MIN: Performed by: ORTHOPAEDIC SURGERY

## 2022-05-26 PROCEDURE — 2500000003 HC RX 250 WO HCPCS: Performed by: NURSE PRACTITIONER

## 2022-05-26 PROCEDURE — 7100000011 HC PHASE II RECOVERY - ADDTL 15 MIN: Performed by: ORTHOPAEDIC SURGERY

## 2022-05-26 PROCEDURE — 2709999900 HC NON-CHARGEABLE SUPPLY: Performed by: ORTHOPAEDIC SURGERY

## 2022-05-26 PROCEDURE — 6360000002 HC RX W HCPCS: Performed by: ORTHOPAEDIC SURGERY

## 2022-05-26 PROCEDURE — A4217 STERILE WATER/SALINE, 500 ML: HCPCS | Performed by: ORTHOPAEDIC SURGERY

## 2022-05-26 PROCEDURE — 6360000002 HC RX W HCPCS: Performed by: NURSE PRACTITIONER

## 2022-05-26 PROCEDURE — 7100000001 HC PACU RECOVERY - ADDTL 15 MIN: Performed by: ORTHOPAEDIC SURGERY

## 2022-05-26 PROCEDURE — 6360000002 HC RX W HCPCS: Performed by: NURSE ANESTHETIST, CERTIFIED REGISTERED

## 2022-05-26 PROCEDURE — 88311 DECALCIFY TISSUE: CPT

## 2022-05-26 PROCEDURE — 2500000003 HC RX 250 WO HCPCS: Performed by: NURSE ANESTHETIST, CERTIFIED REGISTERED

## 2022-05-26 PROCEDURE — 6370000000 HC RX 637 (ALT 250 FOR IP): Performed by: ORTHOPAEDIC SURGERY

## 2022-05-26 PROCEDURE — 6360000002 HC RX W HCPCS

## 2022-05-26 PROCEDURE — 3700000001 HC ADD 15 MINUTES (ANESTHESIA): Performed by: ORTHOPAEDIC SURGERY

## 2022-05-26 PROCEDURE — C1776 JOINT DEVICE (IMPLANTABLE): HCPCS | Performed by: ORTHOPAEDIC SURGERY

## 2022-05-26 PROCEDURE — 88305 TISSUE EXAM BY PATHOLOGIST: CPT

## 2022-05-26 PROCEDURE — 2580000003 HC RX 258: Performed by: NURSE PRACTITIONER

## 2022-05-26 PROCEDURE — 73560 X-RAY EXAM OF KNEE 1 OR 2: CPT

## 2022-05-26 PROCEDURE — 97161 PT EVAL LOW COMPLEX 20 MIN: CPT

## 2022-05-26 PROCEDURE — 97110 THERAPEUTIC EXERCISES: CPT

## 2022-05-26 PROCEDURE — 3700000000 HC ANESTHESIA ATTENDED CARE: Performed by: ORTHOPAEDIC SURGERY

## 2022-05-26 PROCEDURE — 6370000000 HC RX 637 (ALT 250 FOR IP): Performed by: NURSE PRACTITIONER

## 2022-05-26 PROCEDURE — 2580000003 HC RX 258: Performed by: ORTHOPAEDIC SURGERY

## 2022-05-26 PROCEDURE — 3600000005 HC SURGERY LEVEL 5 BASE: Performed by: ORTHOPAEDIC SURGERY

## 2022-05-26 PROCEDURE — C1713 ANCHOR/SCREW BN/BN,TIS/BN: HCPCS | Performed by: ORTHOPAEDIC SURGERY

## 2022-05-26 DEVICE — GENESIS II NON-POROUS TIBIAL                                    BASEPLATE SIZE 3 RIGHT
Type: IMPLANTABLE DEVICE | Status: FUNCTIONAL
Brand: GENESIS II

## 2022-05-26 DEVICE — CEMENT BNE 40 GM RADIOPAQUE BA SIMPLEX P: Type: IMPLANTABLE DEVICE | Status: FUNCTIONAL

## 2022-05-26 DEVICE — LEGION POSTERIOR STABILIZED HIGH                                    FLEX HIGHLY CROSS LINKED                                    POLYETHYLENE SIZE 3-4 9MM
Type: IMPLANTABLE DEVICE | Status: FUNCTIONAL
Brand: LEGION

## 2022-05-26 DEVICE — KNEE K1 TOT HEMI STD CEM IMPL CAPPED K1 SN: Type: IMPLANTABLE DEVICE | Status: FUNCTIONAL

## 2022-05-26 DEVICE — LEGION POSTERIOR STABILIZED                                    OXINIUM FEMORAL SIZE 5 RIGHT
Type: IMPLANTABLE DEVICE | Status: FUNCTIONAL
Brand: LEGION

## 2022-05-26 DEVICE — GEN II 7.5MM RESUR PAT 29MM
Type: IMPLANTABLE DEVICE | Status: FUNCTIONAL
Brand: GENESIS II

## 2022-05-26 RX ORDER — ONDANSETRON 4 MG/1
4 TABLET, ORALLY DISINTEGRATING ORAL EVERY 8 HOURS PRN
Status: CANCELLED | OUTPATIENT
Start: 2022-05-26

## 2022-05-26 RX ORDER — HYDRALAZINE HYDROCHLORIDE 20 MG/ML
10 INJECTION INTRAMUSCULAR; INTRAVENOUS
Status: DISCONTINUED | OUTPATIENT
Start: 2022-05-26 | End: 2022-05-26

## 2022-05-26 RX ORDER — PROPOFOL 10 MG/ML
INJECTION, EMULSION INTRAVENOUS PRN
Status: DISCONTINUED | OUTPATIENT
Start: 2022-05-26 | End: 2022-05-26 | Stop reason: SDUPTHER

## 2022-05-26 RX ORDER — SODIUM CHLORIDE 0.9 % (FLUSH) 0.9 %
5-40 SYRINGE (ML) INJECTION PRN
Status: CANCELLED | OUTPATIENT
Start: 2022-05-26

## 2022-05-26 RX ORDER — SODIUM CHLORIDE 0.9 % (FLUSH) 0.9 %
5-40 SYRINGE (ML) INJECTION EVERY 12 HOURS SCHEDULED
Status: CANCELLED | OUTPATIENT
Start: 2022-05-26

## 2022-05-26 RX ORDER — CELECOXIB 100 MG/1
100 CAPSULE ORAL 2 TIMES DAILY
Qty: 60 CAPSULE | Refills: 0 | Status: SHIPPED | OUTPATIENT
Start: 2022-05-26

## 2022-05-26 RX ORDER — DEXAMETHASONE SODIUM PHOSPHATE 10 MG/ML
8 INJECTION INTRAMUSCULAR; INTRAVENOUS ONCE
Status: COMPLETED | OUTPATIENT
Start: 2022-05-26 | End: 2022-05-26

## 2022-05-26 RX ORDER — MEPERIDINE HYDROCHLORIDE 25 MG/ML
INJECTION INTRAMUSCULAR; INTRAVENOUS; SUBCUTANEOUS
Status: COMPLETED
Start: 2022-05-26 | End: 2022-05-26

## 2022-05-26 RX ORDER — OXYCODONE AND ACETAMINOPHEN 10; 325 MG/1; MG/1
1 TABLET ORAL EVERY 6 HOURS PRN
Qty: 28 TABLET | Refills: 0 | Status: SHIPPED | OUTPATIENT
Start: 2022-05-26 | End: 2022-06-09 | Stop reason: SDUPTHER

## 2022-05-26 RX ORDER — SODIUM CHLORIDE, SODIUM LACTATE, POTASSIUM CHLORIDE, CALCIUM CHLORIDE 600; 310; 30; 20 MG/100ML; MG/100ML; MG/100ML; MG/100ML
INJECTION, SOLUTION INTRAVENOUS CONTINUOUS
Status: DISCONTINUED | OUTPATIENT
Start: 2022-05-26 | End: 2022-05-26 | Stop reason: HOSPADM

## 2022-05-26 RX ORDER — SODIUM CHLORIDE 9 MG/ML
INJECTION, SOLUTION INTRAVENOUS PRN
Status: CANCELLED | OUTPATIENT
Start: 2022-05-26

## 2022-05-26 RX ORDER — ACETAMINOPHEN 325 MG/1
650 TABLET ORAL EVERY 6 HOURS
Status: CANCELLED | OUTPATIENT
Start: 2022-05-27

## 2022-05-26 RX ORDER — PROPOFOL 10 MG/ML
INJECTION, EMULSION INTRAVENOUS CONTINUOUS PRN
Status: DISCONTINUED | OUTPATIENT
Start: 2022-05-26 | End: 2022-05-26 | Stop reason: SDUPTHER

## 2022-05-26 RX ORDER — ONDANSETRON 2 MG/ML
4 INJECTION INTRAMUSCULAR; INTRAVENOUS
Status: DISCONTINUED | OUTPATIENT
Start: 2022-05-26 | End: 2022-05-26

## 2022-05-26 RX ORDER — MEPERIDINE HYDROCHLORIDE 25 MG/ML
12.5 INJECTION INTRAMUSCULAR; INTRAVENOUS; SUBCUTANEOUS EVERY 5 MIN PRN
Status: DISCONTINUED | OUTPATIENT
Start: 2022-05-26 | End: 2022-05-26

## 2022-05-26 RX ORDER — KETAMINE HYDROCHLORIDE 50 MG/ML
INJECTION, SOLUTION, CONCENTRATE INTRAMUSCULAR; INTRAVENOUS PRN
Status: DISCONTINUED | OUTPATIENT
Start: 2022-05-26 | End: 2022-05-26 | Stop reason: SDUPTHER

## 2022-05-26 RX ORDER — KETOROLAC TROMETHAMINE 30 MG/ML
INJECTION, SOLUTION INTRAMUSCULAR; INTRAVENOUS
Status: COMPLETED
Start: 2022-05-26 | End: 2022-05-26

## 2022-05-26 RX ORDER — MELOXICAM 7.5 MG/1
7.5 TABLET ORAL DAILY
Status: CANCELLED | OUTPATIENT
Start: 2022-05-26 | End: 2022-05-29

## 2022-05-26 RX ORDER — CEFAZOLIN SODIUM 2 G/50ML
2000 SOLUTION INTRAVENOUS EVERY 8 HOURS
Status: DISCONTINUED | OUTPATIENT
Start: 2022-05-26 | End: 2022-05-26 | Stop reason: HOSPADM

## 2022-05-26 RX ORDER — LABETALOL 20 MG/4 ML (5 MG/ML) INTRAVENOUS SYRINGE
10
Status: DISCONTINUED | OUTPATIENT
Start: 2022-05-26 | End: 2022-05-26

## 2022-05-26 RX ORDER — MORPHINE SULFATE 2 MG/ML
2 INJECTION, SOLUTION INTRAMUSCULAR; INTRAVENOUS EVERY 5 MIN PRN
Status: DISCONTINUED | OUTPATIENT
Start: 2022-05-26 | End: 2022-05-26

## 2022-05-26 RX ORDER — IPRATROPIUM BROMIDE AND ALBUTEROL SULFATE 2.5; .5 MG/3ML; MG/3ML
1 SOLUTION RESPIRATORY (INHALATION)
Status: DISCONTINUED | OUTPATIENT
Start: 2022-05-26 | End: 2022-05-26

## 2022-05-26 RX ORDER — MIDAZOLAM HYDROCHLORIDE 1 MG/ML
INJECTION INTRAMUSCULAR; INTRAVENOUS PRN
Status: DISCONTINUED | OUTPATIENT
Start: 2022-05-26 | End: 2022-05-26 | Stop reason: SDUPTHER

## 2022-05-26 RX ORDER — MORPHINE SULFATE 2 MG/ML
2 INJECTION, SOLUTION INTRAMUSCULAR; INTRAVENOUS
Status: CANCELLED | OUTPATIENT
Start: 2022-05-26

## 2022-05-26 RX ORDER — SODIUM CHLORIDE 0.9 % (FLUSH) 0.9 %
5-40 SYRINGE (ML) INJECTION EVERY 12 HOURS SCHEDULED
Status: DISCONTINUED | OUTPATIENT
Start: 2022-05-26 | End: 2022-05-26

## 2022-05-26 RX ORDER — FENTANYL CITRATE 50 UG/ML
INJECTION, SOLUTION INTRAMUSCULAR; INTRAVENOUS PRN
Status: DISCONTINUED | OUTPATIENT
Start: 2022-05-26 | End: 2022-05-26 | Stop reason: SDUPTHER

## 2022-05-26 RX ORDER — LORAZEPAM 2 MG/ML
0.5 INJECTION INTRAMUSCULAR
Status: DISCONTINUED | OUTPATIENT
Start: 2022-05-26 | End: 2022-05-26

## 2022-05-26 RX ORDER — ONDANSETRON 2 MG/ML
4 INJECTION INTRAMUSCULAR; INTRAVENOUS EVERY 6 HOURS PRN
Status: CANCELLED | OUTPATIENT
Start: 2022-05-26

## 2022-05-26 RX ORDER — MIDAZOLAM HYDROCHLORIDE 1 MG/ML
INJECTION INTRAMUSCULAR; INTRAVENOUS
Status: COMPLETED
Start: 2022-05-26 | End: 2022-05-26

## 2022-05-26 RX ORDER — SCOLOPAMINE TRANSDERMAL SYSTEM 1 MG/1
1 PATCH, EXTENDED RELEASE TRANSDERMAL
Status: DISCONTINUED | OUTPATIENT
Start: 2022-05-26 | End: 2022-05-26 | Stop reason: HOSPADM

## 2022-05-26 RX ORDER — ROPIVACAINE HYDROCHLORIDE 5 MG/ML
INJECTION, SOLUTION EPIDURAL; INFILTRATION; PERINEURAL
Status: COMPLETED
Start: 2022-05-26 | End: 2022-05-26

## 2022-05-26 RX ORDER — FENTANYL CITRATE 50 UG/ML
INJECTION, SOLUTION INTRAMUSCULAR; INTRAVENOUS
Status: COMPLETED
Start: 2022-05-26 | End: 2022-05-26

## 2022-05-26 RX ORDER — FENTANYL CITRATE 50 UG/ML
50 INJECTION, SOLUTION INTRAMUSCULAR; INTRAVENOUS EVERY 5 MIN PRN
Status: COMPLETED | OUTPATIENT
Start: 2022-05-26 | End: 2022-05-26

## 2022-05-26 RX ORDER — OXYCODONE HYDROCHLORIDE 5 MG/1
10 TABLET ORAL EVERY 4 HOURS PRN
Status: CANCELLED | OUTPATIENT
Start: 2022-05-26

## 2022-05-26 RX ORDER — ROPIVACAINE HYDROCHLORIDE 5 MG/ML
30 INJECTION, SOLUTION EPIDURAL; INFILTRATION; PERINEURAL ONCE
Status: DISCONTINUED | OUTPATIENT
Start: 2022-05-26 | End: 2022-05-26

## 2022-05-26 RX ORDER — PROCHLORPERAZINE EDISYLATE 5 MG/ML
5 INJECTION INTRAMUSCULAR; INTRAVENOUS
Status: DISCONTINUED | OUTPATIENT
Start: 2022-05-26 | End: 2022-05-26

## 2022-05-26 RX ORDER — MIDAZOLAM HYDROCHLORIDE 1 MG/ML
1 INJECTION INTRAMUSCULAR; INTRAVENOUS EVERY 5 MIN PRN
Status: COMPLETED | OUTPATIENT
Start: 2022-05-26 | End: 2022-05-26

## 2022-05-26 RX ORDER — ROPIVACAINE HYDROCHLORIDE 5 MG/ML
INJECTION, SOLUTION EPIDURAL; INFILTRATION; PERINEURAL
Status: COMPLETED | OUTPATIENT
Start: 2022-05-26 | End: 2022-05-26

## 2022-05-26 RX ORDER — SODIUM CHLORIDE 0.9 % (FLUSH) 0.9 %
5-40 SYRINGE (ML) INJECTION PRN
Status: DISCONTINUED | OUTPATIENT
Start: 2022-05-26 | End: 2022-05-26

## 2022-05-26 RX ORDER — CEFAZOLIN SODIUM 2 G/50ML
2000 SOLUTION INTRAVENOUS
Status: COMPLETED | OUTPATIENT
Start: 2022-05-26 | End: 2022-05-26

## 2022-05-26 RX ORDER — ACETAMINOPHEN 500 MG
1000 TABLET ORAL ONCE
Status: COMPLETED | OUTPATIENT
Start: 2022-05-26 | End: 2022-05-26

## 2022-05-26 RX ORDER — SODIUM CHLORIDE, SODIUM LACTATE, POTASSIUM CHLORIDE, CALCIUM CHLORIDE 600; 310; 30; 20 MG/100ML; MG/100ML; MG/100ML; MG/100ML
INJECTION, SOLUTION INTRAVENOUS CONTINUOUS PRN
Status: DISCONTINUED | OUTPATIENT
Start: 2022-05-26 | End: 2022-05-26 | Stop reason: SDUPTHER

## 2022-05-26 RX ORDER — SODIUM CHLORIDE 9 MG/ML
INJECTION, SOLUTION INTRAVENOUS PRN
Status: DISCONTINUED | OUTPATIENT
Start: 2022-05-26 | End: 2022-05-26

## 2022-05-26 RX ORDER — GABAPENTIN 100 MG/1
100 CAPSULE ORAL 3 TIMES DAILY
Qty: 90 CAPSULE | Refills: 0 | Status: SHIPPED | OUTPATIENT
Start: 2022-05-26 | End: 2022-07-07

## 2022-05-26 RX ORDER — BUPIVACAINE HYDROCHLORIDE 7.5 MG/ML
INJECTION, SOLUTION INTRASPINAL PRN
Status: DISCONTINUED | OUTPATIENT
Start: 2022-05-26 | End: 2022-05-26 | Stop reason: SDUPTHER

## 2022-05-26 RX ORDER — CELECOXIB 100 MG/1
200 CAPSULE ORAL ONCE
Status: COMPLETED | OUTPATIENT
Start: 2022-05-26 | End: 2022-05-26

## 2022-05-26 RX ORDER — SODIUM CHLORIDE, SODIUM LACTATE, POTASSIUM CHLORIDE, CALCIUM CHLORIDE 600; 310; 30; 20 MG/100ML; MG/100ML; MG/100ML; MG/100ML
INJECTION, SOLUTION INTRAVENOUS CONTINUOUS
Status: DISCONTINUED | OUTPATIENT
Start: 2022-05-26 | End: 2022-05-26

## 2022-05-26 RX ORDER — DIPHENHYDRAMINE HYDROCHLORIDE 50 MG/ML
INJECTION INTRAMUSCULAR; INTRAVENOUS PRN
Status: DISCONTINUED | OUTPATIENT
Start: 2022-05-26 | End: 2022-05-26 | Stop reason: SDUPTHER

## 2022-05-26 RX ORDER — KETOROLAC TROMETHAMINE 30 MG/ML
30 INJECTION, SOLUTION INTRAMUSCULAR; INTRAVENOUS
Status: DISCONTINUED | OUTPATIENT
Start: 2022-05-26 | End: 2022-05-26

## 2022-05-26 RX ORDER — LIDOCAINE HYDROCHLORIDE 10 MG/ML
INJECTION, SOLUTION INFILTRATION; PERINEURAL PRN
Status: DISCONTINUED | OUTPATIENT
Start: 2022-05-26 | End: 2022-05-26 | Stop reason: SDUPTHER

## 2022-05-26 RX ORDER — CEFAZOLIN SODIUM 2 G/50ML
SOLUTION INTRAVENOUS
Status: COMPLETED
Start: 2022-05-26 | End: 2022-05-26

## 2022-05-26 RX ORDER — VANCOMYCIN HYDROCHLORIDE 1 G/20ML
INJECTION, POWDER, LYOPHILIZED, FOR SOLUTION INTRAVENOUS PRN
Status: DISCONTINUED | OUTPATIENT
Start: 2022-05-26 | End: 2022-05-26 | Stop reason: ALTCHOICE

## 2022-05-26 RX ORDER — DEXTROSE AND SODIUM CHLORIDE 5; .45 G/100ML; G/100ML
INJECTION, SOLUTION INTRAVENOUS CONTINUOUS
Status: CANCELLED | OUTPATIENT
Start: 2022-05-26

## 2022-05-26 RX ORDER — DIPHENHYDRAMINE HYDROCHLORIDE 50 MG/ML
12.5 INJECTION INTRAMUSCULAR; INTRAVENOUS
Status: DISCONTINUED | OUTPATIENT
Start: 2022-05-26 | End: 2022-05-26

## 2022-05-26 RX ADMIN — MEPERIDINE HYDROCHLORIDE 12.5 MG: 25 INJECTION, SOLUTION INTRAMUSCULAR; INTRAVENOUS; SUBCUTANEOUS at 15:00

## 2022-05-26 RX ADMIN — DIPHENHYDRAMINE HYDROCHLORIDE 50 MG: 50 INJECTION, SOLUTION INTRAMUSCULAR; INTRAVENOUS at 12:56

## 2022-05-26 RX ADMIN — PROPOFOL 50 MG: 10 INJECTION, EMULSION INTRAVENOUS at 13:30

## 2022-05-26 RX ADMIN — FENTANYL CITRATE 50 MCG: 50 INJECTION, SOLUTION INTRAMUSCULAR; INTRAVENOUS at 12:08

## 2022-05-26 RX ADMIN — ACETAMINOPHEN 1000 MG: 500 TABLET ORAL at 11:34

## 2022-05-26 RX ADMIN — MIDAZOLAM 1 MG: 1 INJECTION, SOLUTION INTRAMUSCULAR; INTRAVENOUS at 12:10

## 2022-05-26 RX ADMIN — HYDROMORPHONE HYDROCHLORIDE 0.5 MG: 1 INJECTION, SOLUTION INTRAMUSCULAR; INTRAVENOUS; SUBCUTANEOUS at 16:12

## 2022-05-26 RX ADMIN — FENTANYL CITRATE 75 MCG: 50 INJECTION, SOLUTION INTRAMUSCULAR; INTRAVENOUS at 13:32

## 2022-05-26 RX ADMIN — LIDOCAINE HYDROCHLORIDE 20 MG: 10 INJECTION, SOLUTION INFILTRATION; PERINEURAL at 13:12

## 2022-05-26 RX ADMIN — HYDROMORPHONE HYDROCHLORIDE 0.5 MG: 1 INJECTION, SOLUTION INTRAMUSCULAR; INTRAVENOUS; SUBCUTANEOUS at 15:43

## 2022-05-26 RX ADMIN — ROPIVACAINE HYDROCHLORIDE 35 ML: 5 INJECTION, SOLUTION EPIDURAL; INFILTRATION; PERINEURAL at 12:12

## 2022-05-26 RX ADMIN — MEPERIDINE HYDROCHLORIDE 12.5 MG: 25 INJECTION, SOLUTION INTRAMUSCULAR; INTRAVENOUS; SUBCUTANEOUS at 15:28

## 2022-05-26 RX ADMIN — KETAMINE HYDROCHLORIDE 20 MG: 50 INJECTION INTRAMUSCULAR; INTRAVENOUS at 14:04

## 2022-05-26 RX ADMIN — FENTANYL CITRATE 50 MCG: 50 INJECTION, SOLUTION INTRAMUSCULAR; INTRAVENOUS at 12:12

## 2022-05-26 RX ADMIN — MIDAZOLAM 2 MG: 1 INJECTION INTRAMUSCULAR; INTRAVENOUS at 12:56

## 2022-05-26 RX ADMIN — SODIUM CHLORIDE, POTASSIUM CHLORIDE, SODIUM LACTATE AND CALCIUM CHLORIDE: 600; 310; 30; 20 INJECTION, SOLUTION INTRAVENOUS at 14:15

## 2022-05-26 RX ADMIN — BUPIVACAINE HYDROCHLORIDE IN DEXTROSE 2 ML: 7.5 INJECTION, SOLUTION SUBARACHNOID at 13:09

## 2022-05-26 RX ADMIN — KETOROLAC TROMETHAMINE 30 MG: 30 INJECTION, SOLUTION INTRAMUSCULAR at 15:57

## 2022-05-26 RX ADMIN — DEXAMETHASONE SODIUM PHOSPHATE 8 MG: 10 INJECTION INTRAMUSCULAR; INTRAVENOUS at 11:52

## 2022-05-26 RX ADMIN — SODIUM CHLORIDE, POTASSIUM CHLORIDE, SODIUM LACTATE AND CALCIUM CHLORIDE: 600; 310; 30; 20 INJECTION, SOLUTION INTRAVENOUS at 12:43

## 2022-05-26 RX ADMIN — CEFAZOLIN SODIUM 2000 MG: 2 SOLUTION INTRAVENOUS at 16:19

## 2022-05-26 RX ADMIN — MIDAZOLAM 1 MG: 1 INJECTION, SOLUTION INTRAMUSCULAR; INTRAVENOUS at 12:08

## 2022-05-26 RX ADMIN — MIDAZOLAM HYDROCHLORIDE 1 MG: 1 INJECTION INTRAMUSCULAR; INTRAVENOUS at 12:08

## 2022-05-26 RX ADMIN — CEFAZOLIN SODIUM 2000 MG: 2 SOLUTION INTRAVENOUS at 13:11

## 2022-05-26 RX ADMIN — FENTANYL CITRATE 25 MCG: 50 INJECTION, SOLUTION INTRAMUSCULAR; INTRAVENOUS at 13:09

## 2022-05-26 RX ADMIN — HYDROMORPHONE HYDROCHLORIDE 0.5 MG: 1 INJECTION, SOLUTION INTRAMUSCULAR; INTRAVENOUS; SUBCUTANEOUS at 15:25

## 2022-05-26 RX ADMIN — PROPOFOL 20 MCG/KG/MIN: 10 INJECTION, EMULSION INTRAVENOUS at 13:13

## 2022-05-26 RX ADMIN — KETAMINE HYDROCHLORIDE 10 MG: 50 INJECTION INTRAMUSCULAR; INTRAVENOUS at 13:38

## 2022-05-26 RX ADMIN — PROPOFOL 30 MG: 10 INJECTION, EMULSION INTRAVENOUS at 13:13

## 2022-05-26 RX ADMIN — SODIUM CHLORIDE, POTASSIUM CHLORIDE, SODIUM LACTATE AND CALCIUM CHLORIDE: 600; 310; 30; 20 INJECTION, SOLUTION INTRAVENOUS at 11:51

## 2022-05-26 RX ADMIN — HYDROMORPHONE HYDROCHLORIDE 0.5 MG: 1 INJECTION, SOLUTION INTRAMUSCULAR; INTRAVENOUS; SUBCUTANEOUS at 15:50

## 2022-05-26 RX ADMIN — KETAMINE HYDROCHLORIDE 10 MG: 50 INJECTION INTRAMUSCULAR; INTRAVENOUS at 13:50

## 2022-05-26 RX ADMIN — KETOROLAC TROMETHAMINE 30 MG: 30 INJECTION, SOLUTION INTRAMUSCULAR; INTRAVENOUS at 15:57

## 2022-05-26 RX ADMIN — CELECOXIB 200 MG: 100 CAPSULE ORAL at 11:35

## 2022-05-26 RX ADMIN — MIDAZOLAM HYDROCHLORIDE 1 MG: 1 INJECTION INTRAMUSCULAR; INTRAVENOUS at 12:10

## 2022-05-26 RX ADMIN — MEPERIDINE HYDROCHLORIDE 12.5 MG: 25 INJECTION, SOLUTION INTRAMUSCULAR; INTRAVENOUS; SUBCUTANEOUS at 15:15

## 2022-05-26 ASSESSMENT — PAIN DESCRIPTION - PAIN TYPE
TYPE: SURGICAL PAIN

## 2022-05-26 ASSESSMENT — PAIN SCALES - GENERAL
PAINLEVEL_OUTOF10: 9
PAINLEVEL_OUTOF10: 4
PAINLEVEL_OUTOF10: 10
PAINLEVEL_OUTOF10: 0
PAINLEVEL_OUTOF10: 6
PAINLEVEL_OUTOF10: 10
PAINLEVEL_OUTOF10: 10
PAINLEVEL_OUTOF10: 6
PAINLEVEL_OUTOF10: 3
PAINLEVEL_OUTOF10: 10

## 2022-05-26 ASSESSMENT — PAIN DESCRIPTION - DESCRIPTORS
DESCRIPTORS: ACHING;THROBBING
DESCRIPTORS: DISCOMFORT
DESCRIPTORS: ACHING
DESCRIPTORS: ACHING;THROBBING
DESCRIPTORS: DISCOMFORT

## 2022-05-26 ASSESSMENT — PAIN DESCRIPTION - LOCATION
LOCATION: KNEE

## 2022-05-26 ASSESSMENT — PAIN - FUNCTIONAL ASSESSMENT
PAIN_FUNCTIONAL_ASSESSMENT: PREVENTS OR INTERFERES WITH ALL ACTIVE AND SOME PASSIVE ACTIVITIES
PAIN_FUNCTIONAL_ASSESSMENT: ACTIVITIES ARE NOT PREVENTED
PAIN_FUNCTIONAL_ASSESSMENT: 0-10
PAIN_FUNCTIONAL_ASSESSMENT: ACTIVITIES ARE NOT PREVENTED

## 2022-05-26 ASSESSMENT — PAIN DESCRIPTION - ORIENTATION
ORIENTATION: RIGHT

## 2022-05-26 ASSESSMENT — PAIN DESCRIPTION - FREQUENCY
FREQUENCY: CONTINUOUS
FREQUENCY: CONTINUOUS

## 2022-05-26 ASSESSMENT — PAIN DESCRIPTION - ONSET
ONSET: ON-GOING
ONSET: GRADUAL
ONSET: ON-GOING

## 2022-05-26 NOTE — ANESTHESIA PROCEDURE NOTES
Spinal Block    Patient location during procedure: OR  End time: 5/26/2022 1:09 PM  Reason for block: primary anesthetic  Staffing  Performed: resident/CRNA   Anesthesiologist: Kaley Whaley MD  Resident/CRNA: FLOR Rosado CRNA  Spinal Block  Patient position: sitting  Prep: Betadine  Patient monitoring: continuous pulse ox, continuous capnometry, frequent blood pressure checks and oxygen  Approach: midline  Location: L3/L4  Provider prep: sterile gloves and mask  Local infiltration: lidocaine  Needle  Needle type: pencil-tip   Needle gauge: 25 G  Needle length: 3.5 in  Assessment  Sensory level: T8  Swirl obtained: Yes  CSF: clear  Attempts: 1  Hemodynamics: stable  Additional Notes  See anesthesia record for bupiv and fentanyl dose for spinal  Preanesthetic Checklist  Completed: patient identified, IV checked, site marked, risks and benefits discussed, surgical/procedural consents, equipment checked, pre-op evaluation, timeout performed, anesthesia consent given, oxygen available, monitors applied/VS acknowledged, fire risk safety assessment completed and verbalized and blood product R/B/A discussed and consented

## 2022-05-26 NOTE — ANESTHESIA POSTPROCEDURE EVALUATION
Department of Anesthesiology  Postprocedure Note    Patient: Cammy Bojorquez  MRN: [id number]  YOB: 1970  Date of evaluation: 5/26/2022  Time:  4:39 PM     Procedure Summary     Date: 05/26/22 Room / Location: [address] [place] [address] 06 / [facility] [place]    Anesthesia Start: 7530 Anesthesia Stop: 1175    Procedure: RIGHT KNEE TOTAL ARTHROPLASTY [facility]) (Right Knee) Diagnosis:       Degeneration of cartilage in a joint      (RIGHT KNEE DJD)    Surgeons: Kim Joseph DO Responsible Provider: Emir Elizondo MD    Anesthesia Type: spinal ASA Status: 2          Anesthesia Type: No value filed. Claire Phase I: Claire Score: 9    Claire Phase II:      Last vitals: Reviewed and per EMR flowsheets.        Anesthesia Post Evaluation    Patient location during evaluation: PACU  Patient participation: complete - patient participated  Level of consciousness: awake  Airway patency: patent  Nausea & Vomiting: no nausea and no vomiting  Complications: no  Cardiovascular status: hemodynamically stable  Respiratory status: acceptable  Hydration status: euvolemic

## 2022-05-26 NOTE — OP NOTE
Operative Note      Patient: Johanne Vaca  YOB: 1970  MRN: [id number]    Date of Procedure: 5/26/2022    Pre-Op Diagnosis: RIGHT KNEE DJD    Post-Op Diagnosis: Same       Procedure(s):  RIGHT KNEE TOTAL ARTHROPLASTY [facility]PHEW)    Surgeon(s): Joanie Mullins DO    Assistant:   Resident: Selina Serrano DO; Max Martinez DO    Anesthesia: Spinal    Estimated Blood Loss (mL): less than 50     Complications: None    Specimens:   ID Type Source Tests Collected by Time Destination   A : Bone right knee Tissue Tissue SURGICAL PATHOLOGY Joanie Mullins DO 5/26/2022 1352        Implants:  Implant Name Type Inv. Item Serial No.  Lot No. LRB No. Used Action   INSERT TIB SZ 3-4 THK9MM KNEE XLPE POST STBL HI FLX LEGION - [id number]  INSERT TIB SZ 3-4 THK9MM KNEE XLPE POST STBL HI FLX LEGION  [facility] [employer]PH ORTH[employer]AED[employer]- [id number] Right 1 Implanted   COMPONENT FEM SZ 5 R KNEE OXINIUM POST STBL JUANITA LEGION - [id number]  COMPONENT FEM SZ 5 R KNEE OXINIUM POST STBL JUANITA LEGION  [facility] ORTHOPAEDICS- [id number] Right 1 Implanted   BASEPLATE TIB SZ 3 [id number] ML68MM THK2. 3MM R KNEE TI ALLY NP - [id number]  BASEPLATE TIB SZ 3 [id number] ML68MM THK2. 3MM R KNEE TI ALLY NP  [facility] NEPH ORTHOPAED[employer]- [id number] Right 1 Implanted   COMPONENT PAT [id number] THK7. 5MM KNEE POLY RND RESURF GEN II - [id number]  COMPONENT PAT [id number] THK7. 5MM KNEE POLY RND RESURF GEN II  [employer]ha Dus [id number] Right 1 Implanted   CEMENT BNE 40 GM RADIOPAQUE BA SIMPLEX P - [id number]  CEMENT BNE 40 GM RADIOPAQUE BA SIMPLEX P  [employer] [facility]- RAD Right 1 Implanted         Drains: * No LDAs found *    Findings: as above    Detailed Description of Procedure:   below    Department of Orthopedic Surgery  Operative Report        Pre-operative Diagnosis:  Right Knee Osteoarthritis    Post-operative Diagnosis:  Right Knee Osteoarthritis    Procedure:  Right Knee Arthroplasty    Surgeon: Claudette Greenspan, DO     Assistant(s):  As above    Anesthesia:  Spinal anesthesia    Estimated blood loss:  Less than 50 ml    Specimens:  As below    Implants:  As above    Complications:  none    Condition:  Stable    Brief Hospital Course:  Shannan Palomo is a patient known to Claudette Greenspan, DO's practice with persistent complaints of Right knee pain. Knee pain has failed to be relieved by non-operative conservative measures, and has began affecting daily activities of living. After examination of the patient, review of the radiologic studies, and appropriate pre-operative risk assessment, Claudette Greenspan, DO recommended Right knee arthroplasty, which the patient was agreeable towards. Operative Course: The patient was seen and identified outside the operative suite, in which the operative site was marked as appropriate by patient, surgeon, staff, and anesthesia. The patient was then taken into the operative suite, transferred to the operative table with all bony prominences and neurovascular structures well padded and protected. A tourniquet was placed high on the proximal thigh of the operative extremity. The patient was sedated under the care of the anesthesia team. The operative site was prepped and draped in standard sterile fashion. The tourniquet was inflated to 300 mmHg. An anterior midline incision was made over the knee with full-thickness flaps reflected revealing the anterior joint capsule. Medial parapatellar arthrotomy was performed reflecting the patella laterally. Anterior fat pad and anterior horns of the lateral and medial meniscus were sharply excised. The knee was flexed up. Anterior cruciate ligament and posterior cruciate ligament were then excised. All osteophytes on the distal femur were removed with rongeur. At this point, drilling of the intramedullary canal of the distal femur was then performed. Distal femoral cutting jig was then placed and pinned in appropriate position.  An oscillating saw was used to make the distal femoral cut, discarding the pieces of cut femoral bone and sent for study. The posterior reference guide was then placed on the distal femur after the intramedullary guide and the distal femoral cutting guide were then removed. The posterior referencing guide was then pinned in appropriate position. Dez wing was used to confirm appropriate femoral trial size according to pre-operative templating. Posterior reference guide was then removed. An appropriate sized 4-in-1 cutting guide was applied to the distal femur. Oscillating saw was used to make the anterior, posterior, and chamfer cuts. The 4-in-1 cutting guide was then removed. Attention was turned to the tibia. Intramedullary drilling was then performed of the proximal tibia. The intramedullary guide with the proximal tibial cutting guide was then placed on the tibia. Proximal tibial cutting guide was then pinned in appropriate position. After pinning the proximal tibial cutting guide in appropriate position, oscillating saw was then used to make the proximal tibial cut. The guide was then removed. The proximal tibia that was cut was sharply excised and removed. At this time, all osteophytes on the proximal tibia were then removed with a rongeur. Tensiometer was then placed in extension and flexion, confirming appropriate ligamentous balancing. The box-cutting guide for the posterior-stabilized knee was then placed on the distal femur. The box was then cut in the distal femur without complication. Box-cutting guide was then removed. An appropriately sized trial tibial baseplate and a polyethylene component trial were then placed into the knee. The appropriately sized femur trial component was then placed. The knee was reduced and taken through a range of motion and found to be appropriately stable. Half pins were then placed in the tibial base plate confirming position in preparation for keel punch.  The patella was and taken to the PACU in stable condition. Disposition: The patient was taken to PACU in stable condition. Once stable, the patient will be transferred to the floor. Orders have been provided to begin physical therapy, weight bear as tolerated Right lower extremity. Patient received a dose of antibiotics preoperatively. We will continue this for 24 hours postoperatively for infection prophylaxis. The patient will also be started on asa for DVT prophylaxis. We have consulted  and case management for discharge planning and consulted the PCP for medical management.        Electronically signed by Lars Carter DO on 5/26/2022 at 2:31 PM

## 2022-05-26 NOTE — PROGRESS NOTES
1204 The patient was brought to the [facility] department, placed on the cardiac monitor and 2 liters of oxygen. Vital signs taken, /82, Pulse 64, SpO2 98%. 1207 Timeout completed for right sided adductor canal block. 1208 Patient was premedicated as seen in the [facility]   [id number] Right adductor canal block preformed by Dr. Valentina Booth using the ultrasound machine. 1216 Vital signs taken, /82, Pulse 77, SpO2 100 on 3 liters of oxygen. .  The patient tolerated the procedure very well. No complications noted.

## 2022-05-26 NOTE — H&P
Updated H&P    Chief Complaint   Patient presents with    Knee Pain       Right knee pain follow up.          Cindy Capone returns today for follow-up of her right knee pain. she reports this is worse than when I saw her last.  The patient's pain level is a 7/10. The previous treatment was successful.     Past Medical History        Past Medical History:   Diagnosis Date    Arthritis      GERD (gastroesophageal reflux disease)      Hiatal hernia           Past Surgical History         Past Surgical History:   Procedure Laterality Date    CHOLECYSTECTOMY        COLONOSCOPY        ENDOSCOPY, COLON, DIAGNOSTIC        HERNIA REPAIR        OTHER SURGICAL HISTORY   07/12/2017     laparscopic total hysterectomy robotic assissted with bilateral salpingectomy and cystoscopy           Current Medication      Current Outpatient Medications:     vitamin D (ERGOCALCIFEROL) 1.25 MG (01077 UT) CAPS capsule, , Disp: , Rfl:     meloxicam (MOBIC) 15 MG tablet, Take 1 tablet by mouth daily, Disp: 30 tablet, Rfl: 3     No Known Allergies  Social History               Socioeconomic History    Marital status:        Spouse name: Not on file    Number of children: Not on file    Years of education: Not on file    Highest education level: Not on file   Occupational History    Not on file   Tobacco Use    Smoking status: Never Smoker    Smokeless tobacco: Not on file   Substance and Sexual Activity    Alcohol use: Yes       Comment: occasional     Drug use: No    Sexual activity: Not on file   Other Topics Concern    Not on file   Social History Narrative    Not on file      Social Determinants of Health          Financial Resource Strain:     Difficulty of Paying Living Expenses: Not on file   Food Insecurity:     Worried About Running Out of Food in the Last Year: Not on file    Virgil of Food in the Last Year: Not on file   Transportation Needs:     Lack of Transportation (Medical):  Not on file    Lack of Transportation (Non-Medical): Not on file   Physical Activity:     Days of Exercise per Week: Not on file    Minutes of Exercise per Session: Not on file   Stress:     Feeling of Stress : Not on file   Social Connections:     Frequency of Communication with Friends and Family: Not on file    Frequency of Social Gatherings with Friends and Family: Not on file    Attends Mu-ism Services: Not on file    Active Member of [address] [place] [address] or Organizations: Not on file    Attends Club or Organization Meetings: Not on file    Marital Status: Not on file   Intimate Partner Violence:     Fear of Current or Ex-Partner: Not on file    Emotionally Abused: Not on file    Physically Abused: Not on file    Sexually Abused: Not on file   Housing Stability:     Unable to Pay for Housing in the Last Year: Not on file    Number of [place] in the Last Year: Not on file    Unstable Housing in the Last Year: Not on file         Family History   No family history on file.        Review of Systems:      Skin: (-) rash,(-) psoriasis,(-) eczema, (-)skin cancer. Musculoskeletal: (-) fractures,  (-) dislocations,(-) collagen vascular disease, (-) fibromyalgia, (-) multiple sclerosis, (-) muscular dystrophy, (-) RSD,(-) joint pain (-)swelling, (-) joint pain,swelling. Neurologic: (-) epilepsy, (-)seizures,(-) brain tumor,(-) TIA, (-)stroke, (-)headaches, (-)Parkinson disease,(-) memory loss, (-) LOC. Cardiovascular: (-) Chest pain, (-) swelling in legs/feet, (-) SOB, (-) cramping in legs/feet with walking.     Constitutional:    _BP 122/88   Pulse 63   Temp 98 °F (36.7 °C) (Infrared)   Resp 20   Ht 5' 7.5\" (1.715 m)   Wt 195 lb 9.6 oz (88.7 kg)   LMP 07/07/2017   SpO2 100%   BMI 30.18 kg/m²  Vital signs are stable. In general, patient is awake, alert and oriented X3, in no apparent distress. Examination of HENT reveals normocephalic, atraumatic. PERRLA/EOMI sclera are white. Conjunctivae are clear.   TM's are intact. Pharynx is pink and moist.  Uvula and tongue are midline. Heart: Positive S1 and positive S2 with regular rate and rhythm. Lungs: Clear to auscultation bilaterally without rales, rhonchi or wheezes. Abdomen: soft, nontender. Positive bowel sounds. No organomegaly. No guarding or rigidity. The patient is alert and oriented x 3, appears to be stated age and in no distress. /88   Pulse 63   Temp 98 °F (36.7 °C) (Infrared)   Resp 20   Ht 5' 7.5\" (1.715 m)   Wt 195 lb 9.6 oz (88.7 kg)   LMP 07/07/2017   SpO2 100%   BMI 30.18 kg/m²        Skin:  Upon inspection: the skin appears warm, dry and intact. There is not a previous scar over the affected area. There is not any cellulitis, lymphedema or cutaneous lesions noted in the lower extremities. Upon palpation there is no induration noted.       Neurologic:  Gait: normal;  Motor exam of the lower extremities show ; quadriceps, hamstrings, foot dorsi and plantar flexors intact R.  5/5 and L. 5/5. Deep tendon reflexes are 2/4 at the knees and 2/4 at the ankles with strong extensor hallicus longus motor strength bilaterally. Sensory to both feet is intact to all sensory roots.     Cardiovascular: The vascular exam is normal and is well perfused to distal extremities. Distal pulses DP/PT: R. 2+; L. 2+. There is cap refill noted less than two seconds in all digits. There is not edema of the bilateral lower extremities. There is not varicosities noted in the distal extremities.       Lymph:  Upon palpation,  there is no lymphadenopathy noted in bilateral lower extremities.       Musculoskeletal:  Gait: normal; examination of the nails and digits reveal no cyanosis or clubbing     Lumbar exam:  On visual inspection, there is no deformity of the spine. full range of motion, no tenderness, palpable spasm or pain on motion.  Special tests: Straight Leg Raise negative, Leila testnegative.     Hip exam:  Upon inspection, there is no deformity noted.  Upon palpation there is not tenderness. ROM: is   full and semetrical.   Strength: Hip Flexors 5/5; Hip Abductors 5/5; Hip Adduction 5/5.      Knee exam:  Right knee exam shows;  range of motion of R. Knee is 0 to 120, and L. Knee is 0 to 120. She does have  pain on motion, effusion is mild, there is tenderness over the  medial, anterior region, there are not any masses, there is not ligamentous instability, there is  deformity noted. Knee exam: right positive for moderate crepitations, some mild tenderness laxity is not noted with  stress.       R. Knee:  Lachman's negative, Anterior Drawer negative, Posterior Drawer negative  Gama's positive, Thallasy  positive,   PF grind test positive, Apprehension test negative, Patellar J sign  negative  L. Knee:  Lachman's negative, Anterior Drawer negative, Posterior Drawer negative  Gama's negative, Thallasy  negative,   PF grind test negative, Apprehension test negative,  Patellar J sign  negative     Xrays: There is stable medial compartment joint space narrowing, with near complete   joint space loss with weight-bearing.  There are small medial compartment and   patellofemoral compartment osteophytes.  No fracture or dislocation is seen. No joint effusion.  No focal osseous lesions.            MRI:   n/a  Radiographic findings reviewed with patient     Impression:  djd knee right     Plan:   Natural history and expected course discussed. Questions answered. Educational materials distributed. Rest, ice, compression, and elevation (RICE) therapy. Reduction in offending activity. I had a lengthy discussion with the patient regarding their diagnosis. I explained treatment options including surgical vs non surgical treatment. I reviewed in detail the risks and benefits and outlined the procedure in detail with expected outcomes and possible complications.   I also discussed non surgical treatment such as injections (CSI and visco supplementation), physical therapy, topical creams and NSAID's. They have elected for surgical management at this time. We discussed various treatment options both surgical and non-surgical.   The patient is unable to ambulate more than 100 feet and is unable to perform the average daily activities including:  Light housework, ADLs, donning clothes, toileting and exercise. Patient has failed previous conservative measures including cortisone injections, NSAIDs, PT, HEP and pain medication and is currently a fall risk to the disability and decreased functioning. The patient wishes to have the total knee arthroplasty. The risks and benefits of a total knee replacement were discussed with the patient. The risks include but are not limited to: infection, injury to blood vessels and nerves, non relief of symptoms, arthrofibrosis of knee, aseptic loosening of prosthesis, intraoperative fracture, blood loss, PE/DVT, MI, dislocation of hip and knee, need for further operative intervention and death. The patient is aware of the risks and wished to proceed with a Right total knee replacement , she will call with a date, tentative for 5/26/22. The patient was counseled at length about the risks of delma Covid-19 during their perioperative period and any recovery window from their procedure.  The patient was made aware that delma Covid-19  may worsen their prognosis for recovering from their procedure  and lend to a higher morbidity and/or mortality risk.  All material risks, benefits, and reasonable alternatives including postponing the procedure were discussed.  The patient does wish to proceed with the procedure at this time.

## 2022-05-26 NOTE — ANESTHESIA PROCEDURE NOTES
Peripheral Block    Patient location during procedure: PACU  Start time: 5/26/2022 12:12 PM  End time: 5/26/2022 12:16 PM  Staffing  Performed: anesthesiologist   Anesthesiologist: Shani Small MD  Preanesthetic Checklist  Completed: patient identified, IV checked, site marked, risks and benefits discussed, surgical/procedural consents, equipment checked, pre-op evaluation, timeout performed, anesthesia consent given, oxygen available and monitors applied/VS acknowledged  Peripheral Block  Patient position: left lateral decubitus  Prep: ChloraPrep  Patient monitoring: cardiac monitor, continuous pulse ox, frequent blood pressure checks and IV access  Block type: Femoral  Laterality: right  Injection technique: single-shot  Guidance: ultrasound guided  Local infiltration: lidocaine  Infiltration strength: 1 %  Dose: 3 mL  Adductor canal  Provider prep: mask and sterile gloves  Local infiltration: lidocaine  Needle  Needle type: combined needle/nerve stimulator   Needle gauge: 20 G  Needle length: 10 cm  Needle localization: ultrasound guidance  Assessment  Injection assessment: negative aspiration for heme, no paresthesia on injection and local visualized surrounding nerve on ultrasound  Slow fractionated injection: yes  Hemodynamics: stablepermanent images obtained  Additional Notes  U/S [id number].  (1) Under ultrasound guidance, a 22 gauge needle was inserted and placed in close proximity to the right femoral nerve.  (2) Ultrasound was also used to visualize the spread of the anesthetic in close proximity to the nerve being blocked. (3) The nerve appeared anatomically normal, and (4 there were no apparent abnormal pathological findings on the image that were readily visible and related to the nerve being blocked. (5) A permanent ultrasound image was saved in the patient's record.       Medications Administered  Ropivacaine (NAROPIN) injection 0.5%, 35 mL  Reason for block: post-op pain management and at surgeon's request

## 2022-05-26 NOTE — PROGRESS NOTES
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 Patient supervision for bed mobility and transfers and minimal assist for gait progressing to supervision. PHYSICAL THERAPY  PLAN OF CARE     Discharge skilled Physical Therapy. Patient and or family understand(s) diagnosis, prognosis, and plan of care.     Prior Level of Function: Patient ambulated independently   Rehab Potential: good  for baseline    Past medical history:   Past Medical History:   Diagnosis Date    Arthritis     GERD (gastroesophageal reflux disease)     Hiatal hernia      Past Surgical History:   Procedure Laterality Date    ABDOMINOPLASTY  02/2017    CHOLECYSTECTOMY      COLONOSCOPY      ENDOSCOPY, COLON, DIAGNOSTIC      HERNIA REPAIR      HYSTERECTOMY      OTHER SURGICAL HISTORY  07/12/2017    laparscopic total hysterectomy robotic assissted with bilateral salpingectomy and cystoscopy SUBJECTIVE:    Precautions:  ambulate patient, falls ,Right FWB (full weight bearing) right TKA    Social history: Patient lives with spouse in a ranch home  with 1 step to get in through front  to enter without Rail  Walk in shower grab bars    Equipment owned: [employer] and Tub transfer bench    [address]   How much difficulty turning over in bed?: None  How much difficulty sitting down on / standing up from a chair with arms?: A Little  How much difficulty moving from lying on back to sitting on side of bed?: A Little  How much help from another person moving to and from a bed to a chair?: A Little  How much help from another person needed to walk in hospital room?: A Little  How much help from another person for climbing 3-5 steps with a railing?: A Little  AM-PAC Inpatient Mobility Raw Score : 19  AM-PAC Inpatient T-Scale Score : 45.44  Mobility Inpatient CMS 0-100% Score: 41.77  Mobility Inpatient CMS G-Code Modifier : CK    Nursing cleared patient for PT evaluation. The admitting diagnosis and active problem list as listed above have been reviewed prior to the initiation of this evaluation. OBJECTIVE:  Was pt agreeable to Eval/treatment?  Yes   Pain Level  4/10   Right knee   Bed Mobility  Rolling: Supervision     Supine to sit: Supervision     Sit to supine: Supervision     Scooting: Supervision      Transfers Sit to stand: Supervision  cues for hand placement    Ambulation    100 feet using  wheeled walker with Minimal assist of 1  progressing to supervision  for safety and cues for sequencing and upright posture   Stair negotiation: ascended and descended   Not assessed    ROM Within functional limits except Right knee 0-85°   Strength Within functional limits  except  Right lower extremity    Balance Sitting EOB:  good   Dynamic Standing:  good  - wheeled walker      Patient is Alert & Oriented x person, place, time and situation and follows directions    Sensation:  Patient denies numbness/tingling  Edema:  yes right lower extremity   Vitals:  Blood Pressure at rest  118/65 Blood Pressure during session     Heart Rate at rest  Heart Rate during session    SPO2 at rest %  SPO2 during session %     Patient education  Patient educated on role of Physical Therapy, risks of immobility, safety and plan of care,  importance of mobility while in hospital , ankle pumps, quad set and glut set for edema control, blood clot prevention, importance and purpose of adaptive device and adjusted to proper height for the patient. , safety  and stair training       Patient response to education:   Pt verbalized understanding Pt demonstrated skill Pt requires further education in this area   Yes Partial Yes       Treatment:  Patient practiced and was instructed in the following treatment:     Therapist educated and facilitated patient on techniques to increase safety and independence with bed mobility, balance, functional transfers, and functional mobility. Patient performed ankle pumps, quad sets, glut sets x 15-20 each. Active assist heelslide, hip abduction/adduction, straight leg raise x 10 each  Assisted to edge of bed,  Sat edge of bed 10 minutes with Supervision  to increase dynamic sitting balance and activity tolerance. . Ambulated in hallway. Returned to room, assisted back to EOB. Instruction knee extension in bed with kneecap and toes pointing to ceiling  Instruction and performance of incentive inspirometer. At end of session, patient sitting edge of bed with  call light and phone within reach,  all lines and tubes intact, nursing notified. Patient would benefit from skilled Home Physical Therapy to improve functional independence and quality of life. Patient's/ family goals   home today    Patient and or family understand(s) diagnosis, prognosis, and plan of care.       Time in  1755  Time out  1825    Total Treatment Time  10 minutes    Evaluation time includes thorough review of current medical information, gathering information on past medical history/social history and prior level of function, completion of standardized testing/informal observation of tasks, assessment of data, and development of Plan of care and goals.      CPT codes:  Low Complexity PT evaluation (21166)  Therapeutic exercises (58842)   10 minutes  1 unit(s)    Leonora Celestin, PT

## 2022-06-07 DIAGNOSIS — Z96.651 STATUS POST TOTAL RIGHT KNEE REPLACEMENT: Primary | ICD-10-CM

## 2022-06-09 ENCOUNTER — OFFICE VISIT (OUTPATIENT)
Dept: ORTHOPEDIC SURGERY | Age: 52
End: 2022-06-09

## 2022-06-09 VITALS — WEIGHT: 195 LBS | BODY MASS INDEX: 29.55 KG/M2 | HEIGHT: 68 IN

## 2022-06-09 DIAGNOSIS — Z96.651 S/P TOTAL KNEE ARTHROPLASTY, RIGHT: ICD-10-CM

## 2022-06-09 DIAGNOSIS — Z96.651 STATUS POST TOTAL RIGHT KNEE REPLACEMENT: Primary | ICD-10-CM

## 2022-06-09 PROCEDURE — 99024 POSTOP FOLLOW-UP VISIT: CPT | Performed by: ORTHOPAEDIC SURGERY

## 2022-06-09 RX ORDER — OXYCODONE AND ACETAMINOPHEN 10; 325 MG/1; MG/1
1 TABLET ORAL EVERY 6 HOURS PRN
Qty: 28 TABLET | Refills: 0 | Status: SHIPPED
Start: 2022-06-09 | End: 2022-07-07 | Stop reason: SDUPTHER

## 2022-06-09 NOTE — PROGRESS NOTES
Post-Operative week: 2 Status Post right Total Knee Arthroplasty, surgery date 5/26/2022  Systemic or Specific Complaints:No Complaints    Objective:     General: alert, appears stated age and cooperative   Wound: Wound clean and dry no evidence of infection. , No Erythema, No Edema and No Drainage   Motion: Flexion: 0 to 90 Degrees   DVT Exam: Negative Preston's sign. No cords or calf tenderness. No significant calf/ankle edema. Xrays:  No signs of fracture, there is good alignment, and no signs of aseptic loosening. Radiographic findings reviewed with patient    Assessment:     Encounter Diagnosis   Name Primary?  Status post total right knee replacement Yes      Doing well postoperatively. Plan:     Continues current post-op course  Continues current post-op course, staples were removed at today's appt  Patient is to continue taking enteric coated aspirin 81 mg, 1 tablet twice daily. Patient is to continue wearing CATHY hose until next follow up visit but wear during the day and remove at night. Outpatient physical therapy is to begin immediately. No bathing/swimming/hot tub for two weeks or until incision is completely closed. We will see the patient back in 3 weeks for repeat xray and evaluation.

## 2022-06-13 ENCOUNTER — EVALUATION (OUTPATIENT)
Dept: PHYSICAL THERAPY | Age: 52
End: 2022-06-13
Payer: COMMERCIAL

## 2022-06-13 DIAGNOSIS — Z96.651 STATUS POST TOTAL RIGHT KNEE REPLACEMENT: Primary | ICD-10-CM

## 2022-06-13 PROCEDURE — 97110 THERAPEUTIC EXERCISES: CPT | Performed by: PHYSICAL THERAPIST

## 2022-06-13 PROCEDURE — 97162 PT EVAL MOD COMPLEX 30 MIN: CPT | Performed by: PHYSICAL THERAPIST

## 2022-06-13 NOTE — PROGRESS NOTES
[facility] OUTPATIENT REHABILITATION  PHYSICAL THERAPY INITIAL EVALUATION         Date:  2022   Patient: Kevin Roper  : 1970  MRN: [id number]  Referring Provider: DO Rachael Turcios  [facility] Diagnosis:      Diagnosis Orders   1. Status post total right knee replacement         Physician Order: Eval and Treat     SUBJECTIVE:     Surgical procedure: Status post total right knee replacement     Date of surgery: 2022    Services provided following surgery: home care    History: TKA due to DJD. Chief complaint: pain, edema, decreased motion, weakness, inability / limited ability to use leg, difficulty walking, difficulty with stairs, limited ability to complete ADLs (dressing , bathing, grooming, transferring , walking), limited ability to complete IADLs (cooking, cleaning, transportation , laundry), limited ability to complete home/outdoor chores/tasks, limited tolerance to weightbearing tasks and weightbearing duration    Pain:   Current: 0-2/10         Imaging results: XR CHEST (2 VW)    Result Date: 2022  EXAMINATION: TWO XRAY VIEWS OF THE CHEST 2022 1:45 pm COMPARISON: None. HISTORY: ORDERING SYSTEM PROVIDED HISTORY: Primary osteoarthritis of right knee TECHNOLOGIST PROVIDED HISTORY: Pre Op Reason for exam:->Pre OP FINDINGS: The lungs are without acute focal process. There is no effusion or pneumothorax. The cardiomediastinal silhouette is without acute process. The osseous structures are without acute process. There is focal scarring/atelectasis at the left lung base. No acute process. XR KNEE RIGHT (1-2 VIEWS)    Result Date: 2022  EXAMINATION: TWO XRAY VIEWS OF THE RIGHT KNEE 2022 9:13 am COMPARISON: None.  HISTORY: ORDERING SYSTEM PROVIDED HISTORY: Status post total right knee replacement FINDINGS: Right TKA aligned anatomically with no abnormal osseous findings and with small joint effusion. Right TKA with small joint effusion. XR KNEE RIGHT (1-2 VIEWS)    Result Date: 5/26/2022  EXAMINATION: TWO XRAY VIEWS OF THE RIGHT KNEE 5/26/2022 2:58 pm COMPARISON: 17 May 2021 HISTORY: ORDERING SYSTEM PROVIDED HISTORY: Post Op TECHNOLOGIST PROVIDED HISTORY: Of operative side while in recovery room. Reason for exam:->Post Op FINDINGS: Two views right knee obtained immediately following right total knee replacement with satisfactory alignment and positioning. There is air in the soft tissues with air-fluid level in the joint space. Satisfactory alignment and positioning immediate following total right knee replacement. Past Medical History:  Past Medical History:   Diagnosis Date    Arthritis     GERD (gastroesophageal reflux disease)     Hiatal hernia      Past Surgical History:   Procedure Laterality Date    ABDOMINOPLASTY  02/2017    CHOLECYSTECTOMY      COLONOSCOPY      ENDOSCOPY, COLON, DIAGNOSTIC      HERNIA REPAIR      HYSTERECTOMY (CERVIX STATUS UNKNOWN)      OTHER SURGICAL HISTORY  07/12/2017    laparscopic total hysterectomy robotic assissted with bilateral salpingectomy and cystoscopy    TOTAL KNEE ARTHROPLASTY Right 5/26/2022    RIGHT KNEE TOTAL ARTHROPLASTY [facility]) performed by Xiao Rodríguez DO at [address]       Medications:   Current Outpatient Medications   Medication Sig Dispense Refill    oxyCODONE-acetaminophen (PERCOCET)  MG per tablet Take 1 tablet by mouth every 6 hours as needed for Pain for up to 7 days. 28 tablet 0    celecoxib (CELEBREX) 100 MG capsule Take 1 capsule by mouth 2 times daily 60 capsule 0    aspirin 325 mg EC tablet Take 1 tablet by mouth 2 times daily 84 tablet 0    gabapentin (NEURONTIN) 100 MG capsule Take 1 capsule by mouth 3 times daily for 30 days.  Intended supply: 30 days 90 capsule 0    NONFORMULARY Vitamin A, D, E, C.      vitamin D (ERGOCALCIFEROL) 1.25 MG (51470 UT) CAPS capsule        No current facility-administered medications for this visit. Occupation: [employer]; . Exercise regimen: none    Hobbies: working around the house    Patient Goals: return to prior activity, full use of leg    Precautions/Contraindications: recent surgery    OBJECTIVE:     Estimated body mass index is 30.09 kg/m² as calculated from the following:    Height as of 6/9/22: 5' 7.5\" (1.715 m). Weight as of 6/9/22: 195 lb (88.5 kg). Observations: Well nourished female with normal affect. Rises carefully from chair. Ambulates with cane. Inspection: Incision edges approximated, no purulent drainage, no redness, no swelling, no tenderness and not hot to touch. Edema: moderate global    Gait: antalgic gait, limp R LE, ambulates with cane    Joint/Motion:    Knee:  Right:   AROM: 80° Flexion,  -5° Extension  Left:   AROM: 130° Flexion,  0° Extension    Strength:    Knee:   Right: Flexion 3-/5,  Extension 3-/5  Left: Flexion 5/5,  Extension 5/5    Palpation: Non-tender to palpation  . ASSESSMENT     Patient is 3 weeks post total knee arthroplasty . Patient has moderate limitations in both flexion and extension as well as moderate edema along with impaired strength, function, gait, weightbearing tolerance. Treatment will start with methods to control swelling as well as AROM and stretching . Progression to strengthening, functional training, gait training, gait device advancement, and balance / proprioception exercises once motion and edema are under control.       Problems:   Pain 2/10 intermittent  Edema moderate  ROM decreased  Strength decreased   Limitations with walking, stairs, standing, sitting, work, ADLs , IADLs , use of right lower extremity, limited tolerance to weightbearing tasks and weightbearing duration    [x] There are no barriers affecting plan of care or recovery    [] Barriers to this patient's plan of care or recovery include:    Domestic Concerns: [x] No  [] Yes:    Short Term goals (3 weeks)   Pain 1/10   Edema mild   ° Flexion,  0° Extension   Strength 4/5    Able to perform / complete the following functions / tasks: ADLs, ambulate without assistive device, tolerate weightbearing activities for 1 - 2 hours    Long Term goals (6 weeks)   Pain 0/10   Edema none   ° Flexion,  0° Extension   Strength 5/5   Able to perform / complete the following functions / tasks: IADLs, normal gait, normal stair negotiation , reach / lift / carry light weighted items in performance of home or work demands, tolerate weightbearing activities for 2 - 4 hours   Independent with home exercise program (HEP)    Rehab Potential: [x] Good  [] Fair  [] Poor    PLAN       Treatment Plan:  instruction in home exercise program   therapeutic exercise   therapeutic activity   neuromuscular re-education   balance training   proprioceptive training   manual therapy     The following CPT codes are likely to be used in the care of this patient:   89803 PT Evaluation: Moderate Complexity   32710 PT Re-Evaluation   10012 Therapeutic Exercise   16671 Neuromuscular Re-Education   80326 Therapeutic Activities   65728 Manual Therapy     Suggested Professional Referral: [x] No  [] Yes:     Patient Education:  [x] Plans / Goals, Risks / Benefits discussed  [x] Home exercise program  Method of Education: [x] Verbal  [x] Demo  [x] Written  Comprehension of Education:  [x] Verbalizes understanding. [x] Demonstrates understanding. [] Needs Review. [] Demonstrates / verbalizes understanding of HEP / [facility] previously given. Frequency:  1-2 days per week for 6 weeks    Patient understands diagnosis/prognosis and consents to treatment, plan and goals: [x] Yes    [] No     Thank you for the opportunity to work with your patient. If you have questions or comments, please contact me at [phone]; fax: [phone].     Electronically signed by: Monique Gonzales PT         Please sign [de-identified] Certification and return to: [address] [place] [address] Drive PHYSICAL THERAPY  [address] [facility] [address]  Dept: [phone]  Dept Fax: [phone]  Loc: [phone] Certification / Comments     Frequency/Duration 1-2 days per week for 6 weeks. Certification period from 6/13/2022  to 09/13/2022. I have reviewed the Plan of Care established for skilled therapy services and certify that the services are required and that they will be provided while the patient is under my care.     Physician's Comments/Revisions:               Physician's Printed Name:                                           [de-identified] Signature:                                                               Date:

## 2022-06-13 NOTE — PROGRESS NOTES
Physical Therapy Daily Treatment Note    Date: 2022  Patient Name: Karolyn Gates  : 1970   MRN: [id number]  DOInjury: --  DOSx: 2022  Referring Provider: Darien Tucker DO  [address] [place] [address]     Medical Diagnosis:      Diagnosis Orders   1. Status post total right knee replacement         Access Code: [email or web address]  URL: [email or web address]  Date: 2022  Prepared by: Yanick Lantigua    Program Notes  Proper Elevation    -- Elevate limb on a stable stack of blankets / pillows so leg is higher than heart. -- Do this for 45 minutes, 2-3 times a day  -- You may apply ice for the first 15 minutes, then remove it while you continue your elevation  -- Be sure to lie flat in bed or on a couch with a comfortable pillow under your head. Lying back in a recliner is not helpful.  -- You may prop your head up if you cannot tolerate lying completely flat, but only as high as necessary for comfort. Exercises  Supine Quad Set - 2 x daily - 3 sets - 10-15 reps - 5 sec hold  Supine Heel Slide - 2 x daily - 3 sets - 10 reps  Supine Straight Leg Raises - 2 x daily - 3 sets - 10 reps  Heel Prop - 2 x daily - 1 sets - 2-3 min hold  Seated Knee Flexion Stretch - 2 x daily - 3 reps - 30 sec hold      X = TO BE PERFORMED NEXT VISIT  > = PROGRESS TO THIS    S: See eval  O:    Time  6817-0339       Visit  -  1-2/wk   Repeat outcome measure at mid point and end.     Pain    Pain 2/10     ROM  80° Flexion,  -5° Extension     Modalities          MO   Manual      Stretching knee flexion   MT   Stretching       Patella mobs X  TE   Prone hangs   TE   Heel props 1 x 2 min  TE   Knee flex stretch-seated 3 x 30 sec  TE   Prone self flexion stretch   TE   Exercise       Nustep  X  TE   Quad sets 5 sec hold 3 x 10 reps  TE   Heel slides 3 x 10   TE   SLR 3 x 10  TE   LAQ   TE   Marching   TA   Squat    TA   Step-ups - FWD    TA   Step-ups - LAT   TA   Step-ups - BWD    TA   Step up and over reciprocally    TA   [] TG  [] Leg Press 2-leg   TE   [] TG  [] Leg Press 1-leg   TE   CR    TE   Knee Extension Machine   TE   Marching gait   NR   Side stepping   NR               A: Tolerated well. Discussed anatomy, physiology, body mechanics, principles of loading, and progressive loading/activity. Reviewed home exercise program extensively along with instructions for ice and elevation; written copy provided.     P: Continue with rehab plan  Yanick Lantigua PT    Treatment Charges: Mins Units   Initial Evaluation 30 1   Re-Evaluation     Ther Exercise         TE 10 1   Manual Therapy     MT     Ther Activities        TA     Gait Training          GT     Neuro Re-education NR     Modalities     Non-Billable Service Time     Other     Total Time/Units 40 2

## 2022-06-20 ENCOUNTER — TREATMENT (OUTPATIENT)
Dept: PHYSICAL THERAPY | Age: 52
End: 2022-06-20
Payer: COMMERCIAL

## 2022-06-20 DIAGNOSIS — Z96.651 STATUS POST TOTAL RIGHT KNEE REPLACEMENT: Primary | ICD-10-CM

## 2022-06-20 PROCEDURE — 97110 THERAPEUTIC EXERCISES: CPT

## 2022-06-20 NOTE — PROGRESS NOTES
Physical Therapy Daily Treatment Note    Date: 2022  Patient Name: Lai Brito  : 1970   MRN: [id number]  DOInjury: --  DOSx: 2022    Referring Provider:   Kurtis Hinojosa DO  [address]  [facility] Diagnosis:    Diagnosis Orders   1. Status post total right knee replacement       Access Code: [email or web address]  URL: [email or web address]  Date: 2022  Prepared by: Carla Jarquin    Program Notes  Proper Elevation    -- Elevate limb on a stable stack of blankets / pillows so leg is higher than heart. -- Do this for 45 minutes, 2-3 times a day  -- You may apply ice for the first 15 minutes, then remove it while you continue your elevation  -- Be sure to lie flat in bed or on a couch with a comfortable pillow under your head. Lying back in a recliner is not helpful.  -- You may prop your head up if you cannot tolerate lying completely flat, but only as high as necessary for comfort. Exercises  Supine Quad Set - 2 x daily - 3 sets - 10-15 reps - 5 sec hold  Supine Heel Slide - 2 x daily - 3 sets - 10 reps  Supine Straight Leg Raises - 2 x daily - 3 sets - 10 reps  Heel Prop - 2 x daily - 1 sets - 2-3 min hold  Seated Knee Flexion Stretch - 2 x daily - 3 reps - 30 sec hold      X = TO BE PERFORMED NEXT VISIT  > = PROGRESS TO THIS    S: Patient had a pain pill this morning where she feels that is helping her today not have much pain. O: Patient was 10 minutes late to appointment  Time  1880-7115      Visit      Ref # [id number]  No co pay  No prior auth  19 visits remaining. PT, OT, Chiro combined  1-2/wk   Repeat outcome measure at mid point and end.     Pain    Pain 2/10     ROM  80° Flexion,  -5° Extension     Modalities          MO   Manual      Stretching knee flexion   MT   Stretching       Patella mobs X  TE   Prone hangs   TE   Heel props 1 x 2 min  TE   Knee flex stretch-seated 3 x 30 sec  TE   Prone self flexion stretch   TE Exercise       Nustep  L5 x 10 min New TE   Quad sets 5 sec hold 3 x 10 reps  TE   Heel slides 3 x 10   TE   SLR 3 x 10  TE   LAQ 2 x 10 reps New TE   Marching   TA   Squat    TA   Step-ups - FWD    TA   Step-ups - LAT   TA   Step-ups - BWD    TA   Step up and over reciprocally    TA   [x] TG  [] Leg Press 2-leg L17 3 x 10 reps New TE   [] TG  [] Leg Press 1-leg   TE   CR    TE   Knee Extension Machine   TE   Marching gait   NR   Side stepping   NR               A: Tolerated well. Discussed anatomy, physiology, body mechanics, principles of loading, and progressive loading/activity. Reviewed home exercise program extensively along with instructions for ice and elevation; written copy provided.     P: Continue with rehab plan  Jalen Comp, PTA    Treatment Charges: Mins Units   Initial Evaluation     Re-Evaluation     Ther Exercise         TE 30 2   Manual Therapy     MT     Ther Activities        TA     Gait Training          GT     Neuro Re-education NR     Modalities     Non-Billable Service Time     Other     Total Time/Units 30 2

## 2022-06-27 ENCOUNTER — TREATMENT (OUTPATIENT)
Dept: PHYSICAL THERAPY | Age: 52
End: 2022-06-27
Payer: COMMERCIAL

## 2022-06-27 DIAGNOSIS — Z96.651 STATUS POST TOTAL RIGHT KNEE REPLACEMENT: Primary | ICD-10-CM

## 2022-06-27 PROCEDURE — 97110 THERAPEUTIC EXERCISES: CPT

## 2022-06-27 NOTE — PROGRESS NOTES
Physical Therapy Daily Treatment Note    Date: 2022  Patient Name: Chance Noonan  : 1970   MRN: [id number]  DOInjury: --  DOSx: 2022    Referring Provider:   Radha Quintanilla DO  [address] [facility] Diagnosis:    Diagnosis Orders   1. Status post total right knee replacement       Access Code: [email or web address]  URL: [email or web address]  Date: 2022  Prepared by: Cheryle Miramontes    Program Notes  Proper Elevation    -- Elevate limb on a stable stack of blankets / pillows so leg is higher than heart. -- Do this for 45 minutes, 2-3 times a day  -- You may apply ice for the first 15 minutes, then remove it while you continue your elevation  -- Be sure to lie flat in bed or on a couch with a comfortable pillow under your head. Lying back in a recliner is not helpful.  -- You may prop your head up if you cannot tolerate lying completely flat, but only as high as necessary for comfort. Exercises  Supine Quad Set - 2 x daily - 3 sets - 10-15 reps - 5 sec hold  Supine Heel Slide - 2 x daily - 3 sets - 10 reps  Supine Straight Leg Raises - 2 x daily - 3 sets - 10 reps  Heel Prop - 2 x daily - 1 sets - 2-3 min hold  Seated Knee Flexion Stretch - 2 x daily - 3 reps - 30 sec hold      X = TO BE PERFORMED NEXT VISIT  > = PROGRESS TO THIS    S: Patient had a pain pill this morning where she feels that is helping her today not have much pain. O: Patient was 10 minutes late to appointment  Time  6131-6472      Visit  3/6-12    Ref # [id number]  No co pay  No prior auth  19 visits remaining. PT, OT, Chiro combined  1-2/wk   Repeat outcome measure at mid point and end.     Pain    Pain 2/10     ROM  90° Flexion,  -4° Extension 2022    Modalities          MO   Manual      Stretching knee flexion   MT   Stretching       Patella mobs X  TE   Prone hangs   TE   Heel props 3 min  TE   Knee flex stretch-seated 3 x 30 sec  TE   Prone self flexion stretch   TE Exercise       Nustep  L5 x 10 min Seat 9 TE   Quad sets 5 sec hold 3 x 10 reps  TE   Heel slides 3 x 10   TE   SLR 3 x 10  TE   LAQ 2 x 15 reps Add weight next TE   Marching   TA   Squat    TA   Step-ups - FWD    TA   Step-ups - LAT   TA   Step-ups - BWD    TA   Step up and over reciprocally    TA   [x] TG  [] Leg Press 2-leg L17 3 x 10 reps  TE   [] TG  [] Leg Press 1-leg   TE   CR    TE   Knee Extension Machine   TE   Marching gait   NR   Side stepping   NR               A: Tolerated well. Discussed anatomy, physiology, body mechanics, principles of loading, and progressive loading/activity. Reviewed home exercise program extensively along with instructions for ice and elevation; written copy provided.     P: Continue with rehab plan  Sherol Bowels, PTA    Treatment Charges: Mins Units   Initial Evaluation     Re-Evaluation     Ther Exercise         TE 30 2   Manual Therapy     MT     Ther Activities        TA     Gait Training          GT     Neuro Re-education NR     Modalities     Non-Billable Service Time     Other     Total Time/Units 30 2

## 2022-07-05 ENCOUNTER — TREATMENT (OUTPATIENT)
Dept: PHYSICAL THERAPY | Age: 52
End: 2022-07-05
Payer: COMMERCIAL

## 2022-07-05 DIAGNOSIS — Z96.651 STATUS POST TOTAL RIGHT KNEE REPLACEMENT: Primary | ICD-10-CM

## 2022-07-05 PROCEDURE — 97110 THERAPEUTIC EXERCISES: CPT

## 2022-07-05 NOTE — PROGRESS NOTES
Physical Therapy Daily Treatment Note    Date: 2022  Patient Name: Siena Zuluaga  : 1970   MRN: [id number]  DOInjury: --  DOSx: 2022    Referring Provider:   Ana Marie, [address] [place] [address] Diagnosis:   1. Status post total right knee replacement        Access Code: [email or web address]  URL: [email or web address]  Date: 2022  Prepared by: Pieter Sharpe    Program Notes  Proper Elevation    -- Elevate limb on a stable stack of blankets / pillows so leg is higher than heart. -- Do this for 45 minutes, 2-3 times a day  -- You may apply ice for the first 15 minutes, then remove it while you continue your elevation  -- Be sure to lie flat in bed or on a couch with a comfortable pillow under your head. Lying back in a recliner is not helpful.  -- You may prop your head up if you cannot tolerate lying completely flat, but only as high as necessary for comfort. Exercises  Supine Quad Set - 2 x daily - 3 sets - 10-15 reps - 5 sec hold  Supine Heel Slide - 2 x daily - 3 sets - 10 reps  Supine Straight Leg Raises - 2 x daily - 3 sets - 10 reps  Heel Prop - 2 x daily - 1 sets - 2-3 min hold  Seated Knee Flexion Stretch - 2 x daily - 3 reps - 30 sec hold      X = TO BE PERFORMED NEXT VISIT  > = PROGRESS TO THIS    S:not sure why but leg hurts and can't bend it as much  O: entered clinic with [facility]  Time  0830-8806      Visit  -    Ref # [id number]  No co pay  No prior auth  19 visits remaining. PT, OT, Chiro combined  1-2/wk   Repeat outcome measure at mid point and end.     Pain    Pain 2/10     ROM  90° Flexion,  -4° Extension 2022    Modalities          MO   Manual      Stretching knee flexion   MT   Stretching       Patella mobs 20-30x HEP TE   Prone hangs   TE   Heel props 3#    min  TE   Knee flex stretch-seated 3 x 1 min HEP TE   Prone self flexion stretch   TE   Exercise       Nustep  L5 x 10 min Seat 9- 8 TE   Quad sets  TE   Heel slides  TE   SLR  TE   LAQ 3# 2 x 15 reps  TE   Marching   TA   Squat    TA   Step-ups - FWD    TA   Step-ups - LAT   TA   Step-ups - BWD    TA   Step up and over reciprocally    TA   [x] TG  [] Leg Press 2-leg L17 2 x 30 reps  TE   [] TG  [] Leg Press 1-leg   TE   CR    TE   Knee Extension Machine   TE   Marching gait   NR   Side stepping   NR         Gait training 75 ft. X 3  SC     A: Tolerated well. Discussed anatomy, physiology, body mechanics, principles of loading, and progressive loading/activity. Reviewed home exercise program extensively along with instructions for ice and elevation; written copy provided.     P: Continue with rehab plan  Gene Mcfadden PTA    Treatment Charges: Mins Units   Initial Evaluation     Re-Evaluation     Ther Exercise         TE 40 3   Manual Therapy     MT     Ther Activities        TA     Gait Training          GT     Neuro Re-education NR     Modalities     Non-Billable Service Time     Other     Total Time/Units 40 3

## 2022-07-07 ENCOUNTER — OFFICE VISIT (OUTPATIENT)
Dept: ORTHOPEDIC SURGERY | Age: 52
End: 2022-07-07

## 2022-07-07 ENCOUNTER — TREATMENT (OUTPATIENT)
Dept: PHYSICAL THERAPY | Age: 52
End: 2022-07-07
Payer: COMMERCIAL

## 2022-07-07 VITALS — WEIGHT: 195 LBS | TEMPERATURE: 98 F | HEIGHT: 68 IN | BODY MASS INDEX: 29.55 KG/M2

## 2022-07-07 DIAGNOSIS — Z96.651 STATUS POST TOTAL RIGHT KNEE REPLACEMENT: Primary | ICD-10-CM

## 2022-07-07 DIAGNOSIS — Z96.651 S/P TOTAL KNEE ARTHROPLASTY, RIGHT: ICD-10-CM

## 2022-07-07 PROCEDURE — 97110 THERAPEUTIC EXERCISES: CPT

## 2022-07-07 PROCEDURE — 99024 POSTOP FOLLOW-UP VISIT: CPT | Performed by: NURSE PRACTITIONER

## 2022-07-07 RX ORDER — CELECOXIB 200 MG/1
200 CAPSULE ORAL 2 TIMES DAILY
Qty: 60 CAPSULE | Refills: 0 | Status: SHIPPED | OUTPATIENT
Start: 2022-07-07 | End: 2022-08-06

## 2022-07-07 RX ORDER — OXYCODONE AND ACETAMINOPHEN 10; 325 MG/1; MG/1
1 TABLET ORAL EVERY 8 HOURS PRN
Qty: 21 TABLET | Refills: 0 | Status: SHIPPED
Start: 2022-07-07 | End: 2022-08-02 | Stop reason: SDUPTHER

## 2022-07-07 NOTE — PROGRESS NOTES
Physical Therapy Daily Treatment Note    Date: 2022  Patient Name: Mahad Fontana  : 1970   MRN: [id number]  DOInjury: --  DOSx: 2022    Referring Provider:   Anjana Caraballo, [address] [place] [address]        Medical Diagnosis:   1. Status post total right knee replacement        Access Code: [email or web address]  URL: [email or web address]  Date: 2022  Prepared by: Emre Ayers    Program Notes  Proper Elevation    -- Elevate limb on a stable stack of blankets / pillows so leg is higher than heart. -- Do this for 45 minutes, 2-3 times a day  -- You may apply ice for the first 15 minutes, then remove it while you continue your elevation  -- Be sure to lie flat in bed or on a couch with a comfortable pillow under your head. Lying back in a recliner is not helpful.  -- You may prop your head up if you cannot tolerate lying completely flat, but only as high as necessary for comfort. Exercises  Supine Quad Set - 2 x daily - 3 sets - 10-15 reps - 5 sec hold  Supine Heel Slide - 2 x daily - 3 sets - 10 reps  Supine Straight Leg Raises - 2 x daily - 3 sets - 10 reps  Heel Prop - 2 x daily - 1 sets - 2-3 min hold  Seated Knee Flexion Stretch - 2 x daily - 3 reps - 30 sec hold      X = TO BE PERFORMED NEXT VISIT  > = PROGRESS TO THIS    S: Patient reports doctor told her today to not cut back pain meds just yet and to start taking Celebrex again to help with inflammation/swelling. O: entered clinic with [facility]  Time  5131-2813      Visit      Ref # [id number]  No co pay  No prior auth  19 visits remaining. PT, OT, Chiro combined  1-2/wk   Repeat outcome measure at mid point and end.     Pain    Pain 2/10     ROM  100° Flexion,  -2° Extension  PROM 105°, 0 Extension 2022    Modalities          MO   Manual      Stretching knee flexion   MT   Stretching       Patella mobs 20-30x HEP TE   Prone hangs   TE   Heel props 3# x 5 min  TE   Knee flex stretch-seated 3 x 1 min HEP TE   Prone self flexion stretch   TE   Exercise       Nustep  L5 x 10 min Seat 9- 8 TE   Quad sets  TE   Heel slides 3 x 10  TE   SLR  TE   LAQ 3# 2 x 15 reps  TE   Marching   TA   Squat    TA   Step-ups - FWD    TA   Step-ups - LAT   TA   Step-ups - BWD    TA   Step up and over reciprocally    TA   [x] TG  [] Leg Press 2-leg L17 2 x 30 reps  TE   [] TG  [] Leg Press 1-leg   TE   CR    TE   Knee Extension Machine   TE   Marching gait   NR   Side stepping   NR         Gait training 75 ft. X 3  SC     A: Tolerated well. Discussed anatomy, physiology, body mechanics, principles of loading, and progressive loading/activity. Reviewed home exercise program extensively along with instructions for ice and elevation; written copy provided.     P: Continue with rehab plan  Gracie Livings, PTA    Treatment Charges: Mins Units   Initial Evaluation     Re-Evaluation     Ther Exercise         TE 40 3   Manual Therapy     MT     Ther Activities        TA     Gait Training          GT     Neuro Re-education NR     Modalities     Non-Billable Service Time     Other     Total Time/Units 40 3

## 2022-07-07 NOTE — PROGRESS NOTES
Post-Operative week: 6 Status Post right Total Knee Arthroplasty, DOS: 5/26/22  Systemic or Specific Complaints:No Complaints    Objective:     General: alert, appears stated age and cooperative   Wound: Wound clean and dry no evidence of infection. , No Erythema, No Edema and No Drainage   Motion: Flexion: 5 to 105 Degrees   DVT Exam: No evidence of DVT seen on physical exam.  Negative Preston's sign. No cords or calf tenderness. No significant calf/ankle edema. Xrays:  N/A      Assessment:     Encounter Diagnoses   Name Primary?  S/P total knee arthroplasty, right     Status post total right knee replacement Yes      Doing well postoperatively. Plan:   celebrex  Refill percocet, oarrs run  Continues current post-op course  Patient is to discontinue taking enteric coated aspirin 325mg. Patient is to discontinue wearing CATHY hose. Continue with outpatient physical therapy. Follow up 1 months.

## 2022-07-11 ENCOUNTER — TREATMENT (OUTPATIENT)
Dept: PHYSICAL THERAPY | Age: 52
End: 2022-07-11
Payer: COMMERCIAL

## 2022-07-11 DIAGNOSIS — Z96.651 STATUS POST TOTAL RIGHT KNEE REPLACEMENT: Primary | ICD-10-CM

## 2022-07-11 PROCEDURE — 97110 THERAPEUTIC EXERCISES: CPT

## 2022-07-11 NOTE — PROGRESS NOTES
Physical Therapy Daily Treatment Note    Date: 2022  Patient Name: Devin Kerr  : 1970   MRN: [id number]  DOInjury: --  DOSx: 2022    Referring Provider:   Jay Thomas, [address] [place] [address]        Medical Diagnosis:   1. Status post total right knee replacement        Access Code: [email or web address]  URL: [email or web address]  Date: 2022  Prepared by: Lucy Arguello    Program Notes  Proper Elevation    -- Elevate limb on a stable stack of blankets / pillows so leg is higher than heart. -- Do this for 45 minutes, 2-3 times a day  -- You may apply ice for the first 15 minutes, then remove it while you continue your elevation  -- Be sure to lie flat in bed or on a couch with a comfortable pillow under your head. Lying back in a recliner is not helpful.  -- You may prop your head up if you cannot tolerate lying completely flat, but only as high as necessary for comfort. Exercises  Supine Quad Set - 2 x daily - 3 sets - 10-15 reps - 5 sec hold  Supine Heel Slide - 2 x daily - 3 sets - 10 reps  Supine Straight Leg Raises - 2 x daily - 3 sets - 10 reps  Heel Prop - 2 x daily - 1 sets - 2-3 min hold  Seated Knee Flexion Stretch - 2 x daily - 3 reps - 30 sec hold      X = TO BE PERFORMED NEXT VISIT  > = PROGRESS TO THIS    S: Patient reports she's sleeping better at night and doing good. O: entered clinic with [facility]  Time  7020-1344      Visit  -    Ref # [id number]  No co pay  No prior auth  19 visits remaining. PT, OT, Chiro combined  1-2/wk   Repeat outcome measure at mid point and end.     Pain    Pain 2/10     ROM  100° Flexion,  -2° Extension  PROM 105°, 0 Extension 2022    Modalities          MO   Manual      Stretching knee flexion   MT   Stretching       Patella mobs 20-30x HEP TE   Prone hangs   TE   Heel props 3# x 5 min  TE   Knee flex stretch-seated 3 x 1 min HEP TE   Prone self flexion stretch   TE   Exercise       Nustep  L5 x 10 min Seat 9- 8 TE   Quad sets  TE   Heel slides 3 x 10  TE   SLR 3 x 10 TE   LAQ 3# 2 x 15 reps Increase weight TE   Marching   TA   Squat    TA   Step-ups - FWD    TA   Step-ups - LAT   TA   Step-ups - BWD    TA   Step up and over reciprocally    TA   [x] TG  [] Leg Press 2-leg L17 2 x 30 reps  TE   [] TG  [] Leg Press 1-leg   TE   CR    TE   Knee Extension Machine   TE   Marching gait   NR   Side stepping   NR         Gait training 75 ft. X 3 SC     A: Tolerated well. Discussed anatomy, physiology, body mechanics, principles of loading, and progressive loading/activity. Reviewed home exercise program extensively along with instructions for ice and elevation; written copy provided.     P: Continue with rehab plan  Aura Deal, PTA    Treatment Charges: Mins Units   Initial Evaluation     Re-Evaluation     Ther Exercise         TE 40 3   Manual Therapy     MT     Ther Activities        TA     Gait Training          GT     Neuro Re-education NR     Modalities     Non-Billable Service Time     Other     Total Time/Units 40 3

## 2022-07-15 ENCOUNTER — TREATMENT (OUTPATIENT)
Dept: PHYSICAL THERAPY | Age: 52
End: 2022-07-15
Payer: COMMERCIAL

## 2022-07-15 DIAGNOSIS — Z96.651 STATUS POST TOTAL RIGHT KNEE REPLACEMENT: Primary | ICD-10-CM

## 2022-07-15 PROCEDURE — 97110 THERAPEUTIC EXERCISES: CPT

## 2022-07-15 NOTE — PROGRESS NOTES
Physical Therapy Daily Treatment Note    Date: 7/15/2022  Patient Name: Julio César Braun  : 1970   MRN: [id number]  DOInjury: --  DOSx: 2022    Referring Provider:   Denia Dejesus, [address] [place] [address] Diagnosis:   1. Status post total right knee replacement        Access Code: [email or web address]  URL: [email or web address]  Date: 2022  Prepared by: Dede Richardson    Program Notes  Proper Elevation    -- Elevate limb on a stable stack of blankets / pillows so leg is higher than heart. -- Do this for 45 minutes, 2-3 times a day  -- You may apply ice for the first 15 minutes, then remove it while you continue your elevation  -- Be sure to lie flat in bed or on a couch with a comfortable pillow under your head. Lying back in a recliner is not helpful.  -- You may prop your head up if you cannot tolerate lying completely flat, but only as high as necessary for comfort. Exercises  Supine Quad Set - 2 x daily - 3 sets - 10-15 reps - 5 sec hold  Supine Heel Slide - 2 x daily - 3 sets - 10 reps  Supine Straight Leg Raises - 2 x daily - 3 sets - 10 reps  Heel Prop - 2 x daily - 1 sets - 2-3 min hold  Seated Knee Flexion Stretch - 2 x daily - 3 reps - 30 sec hold      X = TO BE PERFORMED NEXT VISIT  > = PROGRESS TO THIS    S: Patient reports she's sleeping better at night and doing good. O:Entered clinic with [facility]  Time  3608-3526      Visit      Ref # [id number]  No co pay  No prior auth  19 visits remaining. PT, OT, Chiro combined  1-2/wk   Repeat outcome measure at mid point and end.     Pain    Pain 2/10     ROM  108° Flexion,  -2° Extension 07/15/2022    Modalities          MO   Manual      Stretching knee flexion   MT   Stretching       Patella mobs 20-30x HEP TE   Prone hangs   TE   Heel props 3# x 5 min  TE   Knee flex stretch-seated 3 x 1 min HEP TE   Prone self flexion stretch   TE   Exercise       Nustep  L5 x 10 min Seat 9-7 TE   Quad sets  TE Heel slides 3 x 10   TE   SLR 3 x 10  TE   LAQ 3# 2 x 15 reps  TE   Marching   TA   Squat    TA   Step-ups - FWD  6\" 20 reps  TA   Step-ups - LAT 6\" 20 reps  TA   Step-ups - BWD    TA   Step up and over reciprocally    TA   [x] TG  [] Leg Press 2-leg L17 2 x 30 reps  TE   [] TG  [] Leg Press 1-leg   TE   CR    TE   Knee Extension Machine   TE   Marching gait   NR   Side stepping   NR         Gait training 75 ft. X 3 SC     A: Tolerated well. Discussed anatomy, physiology, body mechanics, principles of loading, and progressive loading/activity. Reviewed home exercise program extensively along with instructions for ice and elevation; written copy provided.     P: Continue with rehab plan  Oc Jo, PTA    Treatment Charges: Mins Units   Initial Evaluation     Re-Evaluation     Ther Exercise         TE 40 3   Manual Therapy     MT     Ther Activities        TA     Gait Training          GT     Neuro Re-education NR     Modalities     Non-Billable Service Time     Other     Total Time/Units 40 3

## 2022-07-19 ENCOUNTER — TREATMENT (OUTPATIENT)
Dept: PHYSICAL THERAPY | Age: 52
End: 2022-07-19
Payer: COMMERCIAL

## 2022-07-19 DIAGNOSIS — Z96.651 STATUS POST TOTAL RIGHT KNEE REPLACEMENT: Primary | ICD-10-CM

## 2022-07-19 PROCEDURE — 97110 THERAPEUTIC EXERCISES: CPT

## 2022-07-19 NOTE — PROGRESS NOTES
Physical Therapy Daily Treatment Note    Date: 2022  Patient Name: Can Flaherty  : 1970   MRN: [id number]  DOInjury: --  DOSx: 2022    Referring Provider:   Eloy Boeck, [address] [place] [address]        Medical Diagnosis:   1. Status post total right knee replacement        Access Code: [email or web address]  URL: [email or web address]  Date: 2022  Prepared by: Hollace Skiff    Program Notes  Proper Elevation    -- Elevate limb on a stable stack of blankets / pillows so leg is higher than heart. -- Do this for 45 minutes, 2-3 times a day  -- You may apply ice for the first 15 minutes, then remove it while you continue your elevation  -- Be sure to lie flat in bed or on a couch with a comfortable pillow under your head. Lying back in a recliner is not helpful.  -- You may prop your head up if you cannot tolerate lying completely flat, but only as high as necessary for comfort. Exercises  Supine Quad Set - 2 x daily - 3 sets - 10-15 reps - 5 sec hold  Supine Heel Slide - 2 x daily - 3 sets - 10 reps  Supine Straight Leg Raises - 2 x daily - 3 sets - 10 reps  Heel Prop - 2 x daily - 1 sets - 2-3 min hold  Seated Knee Flexion Stretch - 2 x daily - 3 reps - 30 sec hold      X = TO BE PERFORMED NEXT VISIT  > = PROGRESS TO THIS    S: Patient reports she's sleeping better at night and doing good. O:Entered clinic with [facility]  Time  1607-8195      Visit      Ref # [id number]  No co pay  No prior auth  19 visits remaining. PT, OT, Chiro combined  1-2/wk   Repeat outcome measure at mid point and end.     Pain    Pain 2/10     ROM  108° Flexion,  -2° Extension 07/15/2022    Modalities          MO   Manual      Stretching knee flexion   MT   Stretching       Patella mobs 20-30x HEP TE   Prone hangs   TE   Heel props 3# x 5 min  TE   Knee flex stretch-seated 3 x 1 min HEP TE   Prone self flexion stretch   TE   Exercise       Nustep  L5 x 10 min Seat 9-7 TE   Quad sets  TE Heel slides 3 x 10   TE   SLR 3 x 10  TE   LAQ 3# 2 x 15 reps  TE   Marching   TA   Squat    TA   Step-ups - FWD  6\" 20 reps  TA   Step-ups - LAT 6\" 20 reps  TA   Step-ups - BWD    TA   Step up and over reciprocally    TA   [x] TG  [] Leg Press 2-leg L17 2 x 30 reps  TE   [] TG  [] Leg Press 1-leg   TE   CR    TE   Knee Extension Machine   TE   Marching gait   NR   Side stepping   NR         Gait training 75 ft. X 3 SC     A: Tolerated well. Discussed anatomy, physiology, body mechanics, principles of loading, and progressive loading/activity. Reviewed home exercise program extensively along with instructions for ice and elevation; written copy provided.     P: Continue with rehab plan  Aj Yi PTA    Treatment Charges: Mins Units   Initial Evaluation     Re-Evaluation     Ther Exercise         TE 40 3   Manual Therapy     MT     Ther Activities        TA     Gait Training          GT     Neuro Re-education NR     Modalities     Non-Billable Service Time     Other     Total Time/Units 40 3

## 2022-07-21 ENCOUNTER — TREATMENT (OUTPATIENT)
Dept: PHYSICAL THERAPY | Age: 52
End: 2022-07-21
Payer: COMMERCIAL

## 2022-07-21 DIAGNOSIS — Z96.651 STATUS POST TOTAL RIGHT KNEE REPLACEMENT: Primary | ICD-10-CM

## 2022-07-21 PROCEDURE — 97110 THERAPEUTIC EXERCISES: CPT

## 2022-07-21 NOTE — PROGRESS NOTES
Physical Therapy Daily Treatment Note    Date: 2022  Patient Name: Mindy Lyons  : 1970   MRN: [id number]  DOInjury: --  DOSx: 2022    Referring Provider:   Mitra Rodriguez, [address] [place] [address] Diagnosis:   1. Status post total right knee replacement        Access Code: [email or web address]  URL: [email or web address]  Date: 2022  Prepared by: Mirna Shane    Program Notes  Proper Elevation    -- Elevate limb on a stable stack of blankets / pillows so leg is higher than heart. -- Do this for 45 minutes, 2-3 times a day  -- You may apply ice for the first 15 minutes, then remove it while you continue your elevation  -- Be sure to lie flat in bed or on a couch with a comfortable pillow under your head. Lying back in a recliner is not helpful.  -- You may prop your head up if you cannot tolerate lying completely flat, but only as high as necessary for comfort. Exercises  Supine Quad Set - 2 x daily - 3 sets - 10-15 reps - 5 sec hold  Supine Heel Slide - 2 x daily - 3 sets - 10 reps  Supine Straight Leg Raises - 2 x daily - 3 sets - 10 reps  Heel Prop - 2 x daily - 1 sets - 2-3 min hold  Seated Knee Flexion Stretch - 2 x daily - 3 reps - 30 sec hold      X = TO BE PERFORMED NEXT VISIT  > = PROGRESS TO THIS    S: Patient reports no pain. O: Entered clinic without cane   Time  3055-8623      Visit      Ref # [id number]  No co pay  No prior auth  19 visits remaining. PT, OT, Chiro combined  1-2/wk   Repeat outcome measure at mid point and end.     Pain    Pain 2/10     ROM  110° Flexion,  -2° Extension 2022    Modalities          MO   Manual      Stretching knee flexion   MT   Stretching       Patella mobs 20-30x HEP TE   Prone hangs   TE   Heel props 3# x 5 min  TE   Knee flex stretch-seated 3 x 1 min HEP TE   Prone self flexion stretch   TE   Exercise       Nustep  L5 x 10 min Seat 8 TE   Quad sets  TE   Heel slides 3 x 10   TE   SLR 3 x 10  TE   LAQ 3# 2 x 15 reps  TE   Marching   TA   Squat    TA   Step-ups - FWD  7\" 20 reps  TA   Step-ups - LAT 7\" 20 reps  TA   Step-ups - BWD    TA   Step up and over reciprocally    TA   [x] TG  [] Leg Press 2-leg L17 2 x 30 reps  TE   [] TG  [] Leg Press 1-leg   TE   CR    TE   Knee Extension Machine   TE   Marching gait   NR   Side stepping   NR         Gait training 75 ft. X 3     A: Tolerated well. Discussed anatomy, physiology, body mechanics, principles of loading, and progressive loading/activity. Reviewed home exercise program extensively along with instructions for ice and elevation; written copy provided. P: Continue with rehab plan.  Next week will be patient's last.   Gracie Livings, PTA    Treatment Charges: Mins Units   Initial Evaluation     Re-Evaluation     Ther Exercise         TE 40 3   Manual Therapy     MT     Ther Activities        TA     Gait Training          GT     Neuro Re-education NR     Modalities     Non-Billable Service Time     Other     Total Time/Units 40 3

## 2022-07-26 ENCOUNTER — TREATMENT (OUTPATIENT)
Dept: PHYSICAL THERAPY | Age: 52
End: 2022-07-26
Payer: COMMERCIAL

## 2022-07-26 DIAGNOSIS — Z96.651 STATUS POST TOTAL RIGHT KNEE REPLACEMENT: Primary | ICD-10-CM

## 2022-07-26 PROCEDURE — 97110 THERAPEUTIC EXERCISES: CPT

## 2022-07-26 NOTE — PROGRESS NOTES
Physical Therapy Daily Treatment Note    Date: 2022  Patient Name: Mikel Aase  : 1970   MRN: [id number]  DOInjury: --  DOSx: 2022    Referring Provider:   Isidra Blank, [address] [place] [address]        Medical Diagnosis:   1. Status post total right knee replacement        Access Code: [email or web address]  URL: [email or web address]  Date: 2022  Prepared by: Trisha Dumont    Program Notes  Proper Elevation    -- Elevate limb on a stable stack of blankets / pillows so leg is higher than heart. -- Do this for 45 minutes, 2-3 times a day  -- You may apply ice for the first 15 minutes, then remove it while you continue your elevation  -- Be sure to lie flat in bed or on a couch with a comfortable pillow under your head. Lying back in a recliner is not helpful.  -- You may prop your head up if you cannot tolerate lying completely flat, but only as high as necessary for comfort. Exercises  Supine Quad Set - 2 x daily - 3 sets - 10-15 reps - 5 sec hold  Supine Heel Slide - 2 x daily - 3 sets - 10 reps  Supine Straight Leg Raises - 2 x daily - 3 sets - 10 reps  Heel Prop - 2 x daily - 1 sets - 2-3 min hold  Seated Knee Flexion Stretch - 2 x daily - 3 reps - 30 sec hold      X = TO BE PERFORMED NEXT VISIT  > = PROGRESS TO THIS    S: Patient reports no pain. Patient had no pain Friday but then went to a party and sat in uncomfortable chairs that caused pain and stiffness to return. Currently feeling real stiff. O: Entered clinic without cane   Time  8421-5632      Visit  10/6-12    Ref # [id number]  No co pay  No prior auth  19 visits remaining. PT, OT, Chiro combined  1-2/wk   Repeat outcome measure at mid point and end.     Pain    Pain 2/10     ROM  110° Flexion,  -2° Extension 2022    MEASURE NEXT    Modalities          MO   Manual      Stretching knee flexion   MT   Stretching       Patella mobs 20-30x HEP TE   Prone hangs   TE   Heel props 3# x 5 min  TE Knee flex stretch-seated 3 x 1 min HEP TE   Prone self flexion stretch   TE   Exercise       Nustep  L5 x 10 min Seat 8 TE   Quad sets  TE   Heel slides  TE   SLR  TE   LAQ 3# 2 x 15 reps  TE   Marching   TA   Squat    TA   Step-ups - FWD  7\" 20 reps  TA   Step-ups - LAT 7\" 20 reps  TA   Step-ups - BWD    TA   Step up and over reciprocally    TA   [x] TG  [] Leg Press 2-leg L17 2 x 30 reps  TE   [] TG  [] Leg Press 1-leg   TE   CR    TE   Knee Extension Machine   TE   Marching gait   NR   Side stepping   NR         Gait training 75 ft. X 3     A: Tolerated well. Discussed anatomy, physiology, body mechanics, principles of loading, and progressive loading/activity. Reviewed home exercise program extensively along with instructions for ice and elevation; written copy provided.     P: Next session will be last.  Jacob Hernandez PTA    Treatment Charges: Mins Units   Initial Evaluation     Re-Evaluation     Ther Exercise         TE 40 3   Manual Therapy     MT     Ther Activities        TA     Gait Training          GT     Neuro Re-education NR     Modalities     Non-Billable Service Time     Other     Total Time/Units 40 3

## 2022-07-29 ENCOUNTER — TREATMENT (OUTPATIENT)
Dept: PHYSICAL THERAPY | Age: 52
End: 2022-07-29
Payer: COMMERCIAL

## 2022-07-29 DIAGNOSIS — Z96.651 STATUS POST TOTAL RIGHT KNEE REPLACEMENT: Primary | ICD-10-CM

## 2022-07-29 PROCEDURE — 97110 THERAPEUTIC EXERCISES: CPT

## 2022-07-29 NOTE — PROGRESS NOTES
Physical Therapy Daily Treatment Note    Date: 2022  Patient Name: Trixie Dugan  : 1970   MRN: [id number]  DOInjury: --  DOSx: 2022    Referring Provider:   Lacie Hart, [address] [place] [address] Diagnosis:   1. Status post total right knee replacement        Access Code: [email or web address]  URL: [email or web address]  Date: 2022  Prepared by: Franca So    Program Notes  Proper Elevation    -- Elevate limb on a stable stack of blankets / pillows so leg is higher than heart. -- Do this for 45 minutes, 2-3 times a day  -- You may apply ice for the first 15 minutes, then remove it while you continue your elevation  -- Be sure to lie flat in bed or on a couch with a comfortable pillow under your head. Lying back in a recliner is not helpful.  -- You may prop your head up if you cannot tolerate lying completely flat, but only as high as necessary for comfort. Exercises  Supine Quad Set - 2 x daily - 3 sets - 10-15 reps - 5 sec hold  Supine Heel Slide - 2 x daily - 3 sets - 10 reps  Supine Straight Leg Raises - 2 x daily - 3 sets - 10 reps  Heel Prop - 2 x daily - 1 sets - 2-3 min hold  Seated Knee Flexion Stretch - 2 x daily - 3 reps - 30 sec hold      X = TO BE PERFORMED NEXT VISIT  > = PROGRESS TO THIS    S: Patient reports stiffness has gone down since last visit but will have random times where knee is sensitive to touch. Increase in swelling noted. O: Entered clinic without cane   Time  6811-3821      Visit      Ref # [id number]  No co pay  No prior auth  19 visits remaining. PT, OT, Chiro combined  1-2/wk   Repeat outcome measure at mid point and end.     Pain    Pain 2/10     ROM  110° Flexion,  -2° Extension 2022        Modalities          MO   Manual      Stretching knee flexion   MT   Stretching       Patella mobs 20-30x HEP TE   Prone hangs   TE   Heel props  TE   Knee flex stretch-seated 3 x 1 min HEP TE   Prone self flexion stretch   TE   Exercise       Nustep  L5 x 10 min Seat 8 TE   Quad sets  TE   Heel slides  TE   SLR  TE   LAQ 3# 2 x 15 reps  TE   Marching   TA   Squat    TA   Step-ups - FWD  7\" 20 reps  TA   Step-ups - LAT 7\" 20 reps  TA   Step-ups - BWD    TA   Step up and over reciprocally    TA   [x] TG  [] Leg Press 2-leg L17 2 x 30 reps  TE   [] TG  [] Leg Press 1-leg   TE   CR    TE   Knee Extension Machine   TE   Marching gait   NR   Side stepping   NR         Gait training 75 ft. X 3     A: Tolerated well. Discussed anatomy, physiology, body mechanics, principles of loading, and progressive loading/activity. Reviewed home exercise program extensively along with instructions for ice and elevation; written copy provided. P: Continue with rehab plan for one more session. Patient displayed decrease in motion and increased swelling. Patient to increase ice/elevation and stretching; will determine if discharge is appropriate next session.   Kelsey Geronimo, ZEE    Treatment Charges: Mins Units   Initial Evaluation     Re-Evaluation     Ther Exercise         TE 40 3   Manual Therapy     MT     Ther Activities        TA     Gait Training          GT     Neuro Re-education NR     Modalities     Non-Billable Service Time     Other     Total Time/Units 40 3

## 2022-08-02 ENCOUNTER — TELEPHONE (OUTPATIENT)
Dept: ORTHOPEDIC SURGERY | Age: 52
End: 2022-08-02

## 2022-08-02 ENCOUNTER — TREATMENT (OUTPATIENT)
Dept: PHYSICAL THERAPY | Age: 52
End: 2022-08-02
Payer: COMMERCIAL

## 2022-08-02 DIAGNOSIS — Z96.651 S/P TOTAL KNEE ARTHROPLASTY, RIGHT: Primary | ICD-10-CM

## 2022-08-02 DIAGNOSIS — Z96.651 STATUS POST TOTAL RIGHT KNEE REPLACEMENT: Primary | ICD-10-CM

## 2022-08-02 PROCEDURE — 97110 THERAPEUTIC EXERCISES: CPT

## 2022-08-02 RX ORDER — OXYCODONE AND ACETAMINOPHEN 10; 325 MG/1; MG/1
1 TABLET ORAL EVERY 12 HOURS PRN
Qty: 14 TABLET | Refills: 0 | Status: SHIPPED | OUTPATIENT
Start: 2022-08-02 | End: 2022-08-09

## 2022-08-02 NOTE — TELEPHONE ENCOUNTER
.Last appointment 6/9/2022  Next appointment   Future Appointments   Date Time Provider Sujatha Lopes   [facility]2/2022  3:[facility]   8/10/2022  9:00 AM Klaudia Tanner, APRN - [facility]      Last refill:  07/07/2022  DOS:  05/26/2022      Patient called in requesting refill of:    oxyCODONE-acetaminophen (PERCOCET)  MG per tablet     [facility] [address], [address] [phone] Mateusz Neff [phone]   [address], [address] [address]

## 2022-08-02 NOTE — PROGRESS NOTES
Physical Therapy Daily Treatment Note    Date: 2022  Patient Name: Jojo Golden  : 1970   MRN: [id number]  DOInjury: --  DOSx: 2022    Referring Provider:   Melonie Ames, [address] [place] [address] Diagnosis:   1. Status post total right knee replacement        Access Code: [email or web address]  URL: [email or web address]  Date: 2022  Prepared by: Juan Luis Suazo    Program Notes  Proper Elevation    -- Elevate limb on a stable stack of blankets / pillows so leg is higher than heart. -- Do this for 45 minutes, 2-3 times a day  -- You may apply ice for the first 15 minutes, then remove it while you continue your elevation  -- Be sure to lie flat in bed or on a couch with a comfortable pillow under your head. Lying back in a recliner is not helpful.  -- You may prop your head up if you cannot tolerate lying completely flat, but only as high as necessary for comfort. Exercises  Supine Quad Set - 2 x daily - 3 sets - 10-15 reps - 5 sec hold  Supine Heel Slide - 2 x daily - 3 sets - 10 reps  Supine Straight Leg Raises - 2 x daily - 3 sets - 10 reps  Heel Prop - 2 x daily - 1 sets - 2-3 min hold  Seated Knee Flexion Stretch - 2 x daily - 3 reps - 30 sec hold      X = TO BE PERFORMED NEXT VISIT  > = PROGRESS TO THIS    S: Patient reports having a lot of pain today. Since last visit patient has been feeling good but today pain has returned. She feels it may be from walking a lot yesterday in sandals when she was shopping. She started developing stiffness last night and then woke up with pain. O: Entered clinic without cane   Time  8157-4079      Visit      Ref # [id number]  No co pay  No prior auth  19 visits remaining. PT, OT, Chiro combined  1-2/wk   Repeat outcome measure at mid point and end.     Pain    Pain 7/10     ROM  110° Flexion,  -2° Extension 2022  MEASURE NEXT      Modalities       Ice 10 min  MO   Manual      Stretching knee flexion   MT   Stretching       Patella mobs 20-30x HEP TE   Prone hangs   TE   Heel props  TE   Knee flex stretch-seated 3 x 1 min HEP TE   Prone self flexion stretch   TE   Exercise       Nustep  L5 x 10 min Seat 8 TE   Quad sets  TE   Heel slides  TE   SLR  TE   LAQ 3# 2 x 15 reps  TE   Marching   TA   Squat    TA   Step-ups - FWD   TA   Step-ups - LAT  TA   Step-ups - BWD    TA   Step up and over reciprocally    TA   [x] TG  [] Leg Press 2-leg L17 2 x 30 reps  TE   [] TG  [] Leg Press 1-leg   TE   CR    TE   Knee Extension Machine   TE   Marching gait   NR   Side stepping   NR         Gait training 75 ft. X 3     A: Tolerated well with ice application after session due to increased pain/swelling. Discussed anatomy, physiology, body mechanics, principles of loading, and progressive loading/activity. Reviewed home exercise program extensively along with instructions for ice and elevation; written copy provided. P: Will continue PT 3 more sessions. Patient to increase ice/elevation and stretching.   Illona Scarce, PTA    Treatment Charges: Mins Units   Initial Evaluation     Re-Evaluation     Ther Exercise         TE 40 3   Manual Therapy     MT     Ther Activities        TA     Gait Training          GT     Neuro Re-education NR     Modalities     Non-Billable Service Time     Other     Total Time/Units 40 3

## 2022-08-04 ENCOUNTER — TREATMENT (OUTPATIENT)
Dept: PHYSICAL THERAPY | Age: 52
End: 2022-08-04
Payer: COMMERCIAL

## 2022-08-04 DIAGNOSIS — Z96.651 STATUS POST TOTAL RIGHT KNEE REPLACEMENT: Primary | ICD-10-CM

## 2022-08-04 PROCEDURE — 97110 THERAPEUTIC EXERCISES: CPT

## 2022-08-04 NOTE — PROGRESS NOTES
Physical Therapy Daily Treatment Note    Date: 2022  Patient Name: Norberto Aguilar  : 1970   MRN: [id number]  DOInjury: --  DOSx: 2022    Referring Provider:   Colletta Paterson, [address] [place] [address] Diagnosis:   1. Status post total right knee replacement        Access Code: [email or web address]  URL: [email or web address]  Date: 2022  Prepared by: Lucila Graham    Program Notes  Proper Elevation    -- Elevate limb on a stable stack of blankets / pillows so leg is higher than heart. -- Do this for 45 minutes, 2-3 times a day  -- You may apply ice for the first 15 minutes, then remove it while you continue your elevation  -- Be sure to lie flat in bed or on a couch with a comfortable pillow under your head. Lying back in a recliner is not helpful.  -- You may prop your head up if you cannot tolerate lying completely flat, but only as high as necessary for comfort. Exercises  Supine Quad Set - 2 x daily - 3 sets - 10-15 reps - 5 sec hold  Supine Heel Slide - 2 x daily - 3 sets - 10 reps  Supine Straight Leg Raises - 2 x daily - 3 sets - 10 reps  Heel Prop - 2 x daily - 1 sets - 2-3 min hold  Seated Knee Flexion Stretch - 2 x daily - 3 reps - 30 sec hold      X = TO BE PERFORMED NEXT VISIT  > = PROGRESS TO THIS    S: Patient reports no pain, just feeling stiff. O: Entered clinic without cane   Time  0679-9749      Visit      Ref # [id number]  No co pay  No prior auth  19 visits remaining. PT, OT, Chiro combined  1-2/wk   Repeat outcome measure at mid point and end.     Pain    Pain 7/10     ROM  110° Flexion,  -2° Extension 2022  MEASURE NEXT      Modalities       Ice 10 min  MO   Manual      Stretching knee flexion   MT   Stretching       Patella mobs 20-30x HEP TE   Prone hangs   TE   Heel props  TE   Knee flex stretch-seated 3 x 1 min HEP TE   Prone self flexion stretch   TE   Exercise       Nustep  L5 x 10 min Seat 8 TE   Quad sets TE   Heel slides  TE   SLR  TE   LAQ 3# 2 x 15 reps  TE   Marching   TA   Squat    TA   Step-ups - FWD  7\" 20 reps  TA   Step-ups - LAT 7\" 20 reps  TA   Step-ups - BWD    TA   Step up and over reciprocally    TA   [x] TG  [] Leg Press 2-leg L17 2 x 30 reps  TE   [] TG  [] Leg Press 1-leg   TE   CR    TE   Knee Extension Machine   TE   Marching gait   NR   Side stepping   NR         Gait training 75 ft. X 3     A: Tolerated well with ice application after session due to increased pain/swelling. Discussed anatomy, physiology, body mechanics, principles of loading, and progressive loading/activity. Reviewed home exercise program extensively along with instructions for ice and elevation; written copy provided. P: Will continue PT 2 more sessions. Patient to increase ice/elevation and stretching.   Jacob Hernandez PTA    Treatment Charges: Mins Units   Initial Evaluation     Re-Evaluation     Ther Exercise         TE 40 3   Manual Therapy     MT     Ther Activities        TA     Gait Training          GT     Neuro Re-education NR     Modalities     Non-Billable Service Time 10 0   Other     Total Time/Units 50 3

## 2022-08-08 ENCOUNTER — TREATMENT (OUTPATIENT)
Dept: PHYSICAL THERAPY | Age: 52
End: 2022-08-08
Payer: COMMERCIAL

## 2022-08-08 DIAGNOSIS — Z96.651 STATUS POST TOTAL RIGHT KNEE REPLACEMENT: Primary | ICD-10-CM

## 2022-08-08 PROCEDURE — 97110 THERAPEUTIC EXERCISES: CPT

## 2022-08-08 NOTE — PROGRESS NOTES
Physical Therapy Daily Treatment Note    Date: 2022  Patient Name: Lupe Goldberg  : 1970   MRN: [id number]  DOInjury: --  DOSx: 2022    Referring Provider:   [place]roso, [address] [place] [address]        Medical Diagnosis:   1. Status post total right knee replacement        Access Code: [email or web address]  URL: [email or web address]  Date: 2022  Prepared by: Oscar Subramanian    Program Notes  Proper Elevation    -- Elevate limb on a stable stack of blankets / pillows so leg is higher than heart. -- Do this for 45 minutes, 2-3 times a day  -- You may apply ice for the first 15 minutes, then remove it while you continue your elevation  -- Be sure to lie flat in bed or on a couch with a comfortable pillow under your head. Lying back in a recliner is not helpful.  -- You may prop your head up if you cannot tolerate lying completely flat, but only as high as necessary for comfort. Exercises  Supine Quad Set - 2 x daily - 3 sets - 10-15 reps - 5 sec hold  Supine Heel Slide - 2 x daily - 3 sets - 10 reps  Supine Straight Leg Raises - 2 x daily - 3 sets - 10 reps  Heel Prop - 2 x daily - 1 sets - 2-3 min hold  Seated Knee Flexion Stretch - 2 x daily - 3 reps - 30 sec hold      X = TO BE PERFORMED NEXT VISIT  > = PROGRESS TO THIS    S: Patient reports feeling good, started taking Alieve and it has been helping her more. O: Entered clinic without cane   Time  8649-9519      Visit      Ref # [id number]  No co pay  No prior auth  19 visits remaining. PT, OT, Chiro combined  1-2/wk   Repeat outcome measure at mid point and end.     Pain    Pain 7/10     ROM  110° Flexion,  -2° Extension 2022        Modalities       Ice 10 min  MO   Manual      Stretching knee flexion   MT   Stretching       Patella mobs 20-30x HEP TE   Prone hangs   TE   Heel props  TE   Knee flex stretch-seated 3 x 1 min HEP TE   Prone self flexion stretch   TE   Exercise       Nustep  L5 x 10 min Seat 8 TE   Quad sets  TE   Heel slides  TE   SLR  TE   LAQ 3# 2 x 15 reps  TE   Marching   TA   Squat    TA   Step-ups - FWD  7\" 20 reps  TA   Step-ups - LAT 7\" 20 reps  TA   Step-ups - BWD    TA   Step up and over reciprocally    TA   [x] TG  [] Leg Press 2-leg L17 2 x 30 reps  TE   [] TG  [] Leg Press 1-leg   TE   CR    TE   Knee Extension Machine   TE   Marching gait   NR   Side stepping   NR         Gait training 75 ft. X 3     A: Tolerated well with ice application after session due to increased pain/swelling. Discussed anatomy, physiology, body mechanics, principles of loading, and progressive loading/activity. Reviewed home exercise program extensively along with instructions for ice and elevation; written copy provided. P: Will continue PT 1 more sessions. Patient to increase ice/elevation and stretching.   Lyudmila Méndez, PTA    Treatment Charges: Mins Units   Initial Evaluation     Re-Evaluation     Ther Exercise         TE 40 3   Manual Therapy     MT     Ther Activities        TA     Gait Training          GT     Neuro Re-education NR     Modalities     Non-Billable Service Time 10 0   Other     Total Time/Units 50 3

## 2022-08-10 ENCOUNTER — OFFICE VISIT (OUTPATIENT)
Dept: ORTHOPEDIC SURGERY | Age: 52
End: 2022-08-10

## 2022-08-10 ENCOUNTER — TREATMENT (OUTPATIENT)
Dept: PHYSICAL THERAPY | Age: 52
End: 2022-08-10
Payer: COMMERCIAL

## 2022-08-10 VITALS — HEIGHT: 68 IN | WEIGHT: 190 LBS | BODY MASS INDEX: 28.79 KG/M2

## 2022-08-10 DIAGNOSIS — Z96.651 STATUS POST TOTAL RIGHT KNEE REPLACEMENT: Primary | ICD-10-CM

## 2022-08-10 DIAGNOSIS — Z96.651 S/P TOTAL KNEE ARTHROPLASTY, RIGHT: Primary | ICD-10-CM

## 2022-08-10 PROCEDURE — 99024 POSTOP FOLLOW-UP VISIT: CPT | Performed by: NURSE PRACTITIONER

## 2022-08-10 PROCEDURE — 97110 THERAPEUTIC EXERCISES: CPT

## 2022-08-10 NOTE — PROGRESS NOTES
Post-Operative week: 10 Status Post right Total Knee Arthroplasty, DOS: 5/26/22  Systemic or Specific Complaints:No Complaints    Objective:     General: alert, appears stated age and cooperative   Wound: Wound clean and dry no evidence of infection. , No Erythema, No Edema and No Drainage   Motion: Flexion: 0 to 115 Degrees   DVT Exam: No evidence of DVT seen on physical exam.  Negative Preston's sign. No cords or calf tenderness. No significant calf/ankle edema. Xrays:  N/A      Assessment:     Encounter Diagnosis   Name Primary? S/P total knee arthroplasty, right Yes      Doing well postoperatively.      Plan:   HEP  ]finish PT  Activity as tolerated   RTW 8/15/22  Fu in 2 months with xr

## 2022-08-10 NOTE — PROGRESS NOTES
Physical Therapy Daily Treatment Note    Date: 8/10/2022  Patient Name: Chica Nelson  : 1970   MRN: [id number]  DOInjury: --  DOSx: 2022    Referring Provider:   Jolanta Longoria, [address] [place] [address] Diagnosis:   1. Status post total right knee replacement        Access Code: [email or web address]  URL: [email or web address]  Date: 2022  Prepared by: Mary Silver    Program Notes  Proper Elevation    -- Elevate limb on a stable stack of blankets / pillows so leg is higher than heart. -- Do this for 45 minutes, 2-3 times a day  -- You may apply ice for the first 15 minutes, then remove it while you continue your elevation  -- Be sure to lie flat in bed or on a couch with a comfortable pillow under your head. Lying back in a recliner is not helpful.  -- You may prop your head up if you cannot tolerate lying completely flat, but only as high as necessary for comfort. Exercises  Supine Quad Set - 2 x daily - 3 sets - 10-15 reps - 5 sec hold  Supine Heel Slide - 2 x daily - 3 sets - 10 reps  Supine Straight Leg Raises - 2 x daily - 3 sets - 10 reps  Heel Prop - 2 x daily - 1 sets - 2-3 min hold  Seated Knee Flexion Stretch - 2 x daily - 3 reps - 30 sec hold      X = TO BE PERFORMED NEXT VISIT  > = PROGRESS TO THIS    S: Patient reports feeling good. No pain just some stiffness. [facility] without cane   Time  0045-9058      Visit  15/6-12    Ref # [id number]  No co pay  No prior auth  19 visits remaining. PT, OT, Chiro combined  1-2/wk   Repeat outcome measure at mid point and end.     Pain    Pain 0/10     ROM  110° Flexion,  -2° Extension 2022        Modalities       Ice 10 min  MO   Manual      Stretching knee flexion   MT   Stretching       Patella mobs 20-30x HEP TE   Prone hangs   TE   Heel props  TE   Knee flex stretch-seated 3 x 1 min HEP TE   Prone self flexion stretch   TE   Exercise       Nustep  L5 x 10 min Seat 8 TE   Quad sets TE   Heel slides  TE   SLR  TE   LAQ 3# 2 x 15 reps  TE   Marching   TA   Squat    TA   Step-ups - FWD  7\" 20 reps  TA   Step-ups - LAT 7\" 20 reps  TA   Step-ups - BWD    TA   Step up and over reciprocally    TA   [x] TG  [] Leg Press 2-leg L17 2 x 30 reps  TE   [] TG  [] Leg Press 1-leg   TE   CR    TE   Knee Extension Machine   TE   Marching gait   NR   Side stepping   NR         Gait training 75 ft. X 3     A: Tolerated well. Discussed anatomy, physiology, body mechanics, principles of loading, and progressive loading/activity. Reviewed home exercise program extensively along with instructions for ice and elevation. P: Today was patient's last session and they are to continue with HEP.   Antonio Banks PTA    Treatment Charges: Mins Units   Initial Evaluation     Re-Evaluation     Ther Exercise         TE 40 3   Manual Therapy     MT     Ther Activities        TA     Gait Training          GT     Neuro Re-education NR     Modalities     Non-Billable Service Time 10 0   Other     Total Time/Units 50 3

## 2022-08-31 ENCOUNTER — TELEPHONE (OUTPATIENT)
Dept: ORTHOPEDIC SURGERY | Age: 52
End: 2022-08-31

## 2022-08-31 NOTE — TELEPHONE ENCOUNTER
Patient has a dental appointment next week and is in need of antibiotics. Pharmacy is update in chart.

## 2022-09-01 RX ORDER — CEPHALEXIN 500 MG/1
2000 CAPSULE ORAL DAILY PRN
Qty: 4 CAPSULE | Refills: 0 | Status: SHIPPED | OUTPATIENT
Start: 2022-09-01

## 2022-09-29 ENCOUNTER — OFFICE VISIT (OUTPATIENT)
Dept: ORTHOPEDIC SURGERY | Age: 52
End: 2022-09-29
Payer: COMMERCIAL

## 2022-09-29 VITALS — WEIGHT: 188.6 LBS | HEIGHT: 68 IN | TEMPERATURE: 98 F | BODY MASS INDEX: 28.58 KG/M2

## 2022-09-29 DIAGNOSIS — Z96.651 S/P TOTAL KNEE ARTHROPLASTY, RIGHT: Primary | ICD-10-CM

## 2022-09-29 PROCEDURE — 99213 OFFICE O/P EST LOW 20 MIN: CPT | Performed by: ORTHOPAEDIC SURGERY

## 2022-09-29 RX ORDER — NAPROXEN 500 MG/1
500 TABLET ORAL 2 TIMES DAILY WITH MEALS
Qty: 60 TABLET | Refills: 3 | Status: SHIPPED | OUTPATIENT
Start: 2022-09-29 | End: 2022-10-29

## 2022-09-29 NOTE — PROGRESS NOTES
Chief Complaint   Patient presents with    Knee Pain     Right knee TKA f/u having pain on the medial side and stiffness       Mook Coppola returns today for follow-up of her right TKA 5/26/22. She states she has stiffness with walking.     Past Medical History:   Diagnosis Date    Arthritis     GERD (gastroesophageal reflux disease)     Hiatal hernia      Past Surgical History:   Procedure Laterality Date    ABDOMINOPLASTY  02/2017    CHOLECYSTECTOMY      COLONOSCOPY      ENDOSCOPY, COLON, DIAGNOSTIC      HERNIA REPAIR      HYSTERECTOMY (CERVIX STATUS UNKNOWN)      OTHER SURGICAL HISTORY  07/12/2017    laparscopic total hysterectomy robotic assissted with bilateral salpingectomy and cystoscopy    TOTAL KNEE ARTHROPLASTY Right 5/26/2022    RIGHT KNEE TOTAL ARTHROPLASTY ([facility]) performed by Wilmer Nicole DO at [address]       Current Outpatient Medications:     cephALEXin (KEFLEX) 500 MG capsule, Take 4 capsules by mouth daily as needed (take 2 g, 1 hour prior to dental procedure), Disp: 4 capsule, Rfl: 0    celecoxib (CELEBREX) 100 MG capsule, Take 1 capsule by mouth 2 times daily, Disp: 60 capsule, Rfl: 0    NONFORMULARY, Vitamin A, D, E, C., Disp: , Rfl:     vitamin D (ERGOCALCIFEROL) 1.25 MG (12872 UT) CAPS capsule, , Disp: , Rfl:     celecoxib (CELEBREX) 200 MG capsule, Take 1 capsule by mouth 2 times daily, Disp: 60 capsule, Rfl: 0  No Known Allergies  Social History     Socioeconomic History    Marital status:      Spouse name: Not on file    Number of children: Not on file    Years of education: Not on file    Highest education level: Not on file   Occupational History    Not on file   Tobacco Use    Smoking status: Never    Smokeless tobacco: Never   Substance and Sexual Activity    Alcohol use: Yes     Comment: occasional     Drug use: No    Sexual activity: Not on file   Other Topics Concern    Not on file   Social History Narrative    Not on file     Social Determinants of Health Financial Resource Strain: Not on file   Food Insecurity: Not on file   Transportation Needs: Not on file   Physical Activity: Not on file   Stress: Not on file   Social Connections: Not on file   Intimate Partner Violence: Not on file   Housing Stability: Not on file     No family history on file. Review of Systems:     Skin: (-) rash,(-) psoriasis,(-) eczema, (-)skin cancer. Musculoskeletal: (-) fractures,  (-) dislocations,(-) collagen vascular disease, (-) fibromyalgia, (-) multiple sclerosis, (-) muscular dystrophy, (-) RSD,(-) joint pain (-)swelling, (-) joint pain,swelling. Neurologic: (-) epilepsy, (-)seizures,(-) brain tumor,(-) TIA, (-)stroke, (-)headaches, (-)Parkinson disease,(-) memory loss, (-) LOC. Cardiovascular: (-) Chest pain, (-) swelling in legs/feet, (-) SOB, (-) cramping in legs/feet with walking. Constitutional:  The patient is alert and oriented x 3, appears to be stated age and in no distress. Temp 98 °F (36.7 °C)   Ht 5' 7.5\" (1.715 m)   Wt 188 lb 9.6 oz (85.5 kg)   LMP 07/07/2017   BMI 29.10 kg/m²     Skin:  Upon inspection: the skin appears warm, dry and intact. There is not a previous scar over the affected area. There is not any cellulitis, lymphedema or cutaneous lesions noted in the lower extremities. Upon palpation there is no induration noted. Neurologic:  Gait: normal;  Motor exam of the lower extremities show ; quadriceps, hamstrings, foot dorsi and plantar flexors intact R.  5/5 and L. 5/5. Deep tendon reflexes are 2/4 at the knees and 2/4 at the ankles with strong extensor hallicus longus motor strength bilaterally. Sensory to both feet is intact to all sensory roots. Cardiovascular: The vascular exam is normal and is well perfused to distal extremities. Distal pulses DP/PT: R. 2+; L. 2+. There is cap refill noted less than two seconds in all digits. There is not edema of the bilateral lower extremities.   There is not varicosities noted in the distal extremities. Lymph:  Upon palpation,  there is no lymphadenopathy noted in bilateral lower extremities. Musculoskeletal:  Gait: antalgic; examination of the nails and digits reveal no cyanosis or clubbing    Lumbar exam:  On visual inspection, there is no deformity of the spine. full range of motion, no tenderness, palpable spasm or pain on motion. Special tests: Straight Leg Raise negative, Leila testnegative. Hip exam:  Upon inspection, there is no deformity noted. Upon palpation there is not tenderness. ROM: is   full and semetrical.   Strength: Hip Flexors 5/5; Hip Abductors 5/5; Hip Adduction 5/5. Knee exam:  Right knee exam shows;  range of motion of R. Knee is 0 to 115, and L. Knee is 0 to 120. She does have  pain on motion, effusion is mild, there is tenderness over the  medial, anterior region, there are not any masses, there is not ligamentous instability, there is not  deformity noted. Knee exam: right positive for moderate crepitations, some mild tenderness laxity is not noted with stress. R. Knee:  Lachman's negative, Anterior Drawer negative, Posterior Drawer negative  Gama's negative, Thallasy  negative,   PF grind test negative, Apprehension test negative, Patellar J sign  negative  L. Knee:  Lachman's negative, Anterior Drawer negative, Posterior Drawer negative  Gama's negative, Thallasy  negative,   PF grind test negative, Apprehension test negative,  Patellar J sign  negative    Xrays: Well aligned prosthesis with no signs of loosening. MRI:   Not performed. Radiographic findings reviewed with patient    Impression:  Encounter Diagnosis   Name Primary? S/P total knee arthroplasty, right Yes       Plan:   Natural history and expected course discussed. Questions answered. Educational materials distributed. I expect she will continue to improve. I will order Naprosyn, Voltaren and ana pain cream.  I will see her back in 2 months.

## 2022-10-17 ENCOUNTER — HOSPITAL ENCOUNTER (OUTPATIENT)
Dept: GENERAL RADIOLOGY | Age: 52
Discharge: HOME | End: 2022-10-19
Payer: COMMERCIAL

## 2022-10-17 VITALS — BODY MASS INDEX: 29.51 KG/M2 | HEIGHT: 67 IN | WEIGHT: 188 LBS

## 2022-10-17 DIAGNOSIS — Z12.31 ENCOUNTER FOR SCREENING MAMMOGRAM FOR MALIGNANT NEOPLASM OF BREAST: ICD-10-CM

## 2022-10-17 PROCEDURE — 77063 BREAST TOMOSYNTHESIS BI: CPT

## 2022-12-01 DIAGNOSIS — Z96.651 S/P TOTAL KNEE ARTHROPLASTY, RIGHT: Primary | ICD-10-CM

## 2022-12-15 DIAGNOSIS — Z96.651 S/P TOTAL KNEE ARTHROPLASTY, RIGHT: Primary | ICD-10-CM

## 2022-12-19 ENCOUNTER — OFFICE VISIT (OUTPATIENT)
Dept: ORTHOPEDIC SURGERY | Age: 52
End: 2022-12-19
Payer: COMMERCIAL

## 2022-12-19 VITALS — WEIGHT: 188 LBS | BODY MASS INDEX: 29.44 KG/M2 | TEMPERATURE: 98 F

## 2022-12-19 DIAGNOSIS — Z96.651 S/P TOTAL KNEE ARTHROPLASTY, RIGHT: Primary | ICD-10-CM

## 2022-12-19 PROCEDURE — 99213 OFFICE O/P EST LOW 20 MIN: CPT | Performed by: ORTHOPAEDIC SURGERY

## 2022-12-19 RX ORDER — NAPROXEN 500 MG/1
500 TABLET ORAL 2 TIMES DAILY WITH MEALS
Qty: 60 TABLET | Refills: 3 | Status: SHIPPED | OUTPATIENT
Start: 2022-12-19 | End: 2023-01-18

## 2022-12-19 RX ORDER — METHYLPREDNISOLONE 4 MG/1
TABLET ORAL
Qty: 1 KIT | Refills: 3 | Status: SHIPPED | OUTPATIENT
Start: 2022-12-19 | End: 2022-12-25

## 2022-12-19 RX ORDER — GABAPENTIN 100 MG/1
100 CAPSULE ORAL 3 TIMES DAILY
Qty: 90 CAPSULE | Refills: 3 | Status: SHIPPED | OUTPATIENT
Start: 2022-12-19 | End: 2023-01-18

## 2022-12-19 NOTE — PROGRESS NOTES
Chief Complaint   Patient presents with    Knee Pain     Right TKA DOS 5/26/22. Knee was doing well until before thanksgiving and knee has started to become painful again. Mostly medial side of knee and stiffness. Also complains of knee being itchy a lot. Tariq Musa returns today for follow-up of her right knee pain. she reports this is better than when I saw her last.  The patient's pain level is a 6/10. The previous treatment was successful.     Past Medical History:   Diagnosis Date    Arthritis     GERD (gastroesophageal reflux disease)     Hiatal hernia      Past Surgical History:   Procedure Laterality Date    ABDOMINOPLASTY  02/2017    CHOLECYSTECTOMY      COLONOSCOPY      ENDOSCOPY, COLON, DIAGNOSTIC      HERNIA REPAIR      HYSTERECTOMY (CERVIX STATUS UNKNOWN)      OTHER SURGICAL HISTORY  07/12/2017    laparscopic total hysterectomy robotic assissted with bilateral salpingectomy and cystoscopy    OVARY REMOVAL      one ovary removed not sure if rt or lt    TOTAL KNEE ARTHROPLASTY Right 05/26/2022    RIGHT KNEE TOTAL ARTHROPLASTY ([facility]) performed by Asia Freitas DO at [address]       Current Outpatient Medications:     naproxen (NAPROSYN) 500 MG tablet, Take 1 tablet by mouth 2 times daily (with meals), Disp: 60 tablet, Rfl: 3    diclofenac sodium (VOLTAREN) 1 % GEL, Apply 4 g topically 4 times daily, Disp: 240 g, Rfl: 3    cephALEXin (KEFLEX) 500 MG capsule, Take 4 capsules by mouth daily as needed (take 2 g, 1 hour prior to dental procedure), Disp: 4 capsule, Rfl: 0    celecoxib (CELEBREX) 200 MG capsule, Take 1 capsule by mouth 2 times daily, Disp: 60 capsule, Rfl: 0    celecoxib (CELEBREX) 100 MG capsule, Take 1 capsule by mouth 2 times daily, Disp: 60 capsule, Rfl: 0    NONFORMULARY, Vitamin A, D, E, C., Disp: , Rfl:     vitamin D (ERGOCALCIFEROL) 1.25 MG (77485 UT) CAPS capsule, , Disp: , Rfl:   No Known Allergies  Social History     Socioeconomic History    Marital status:      Spouse name: Not on file    Number of children: Not on file    Years of education: Not on file    Highest education level: Not on file   Occupational History    Not on file   Tobacco Use    Smoking status: Never    Smokeless tobacco: Never   Substance and Sexual Activity    Alcohol use: Yes     Comment: occasional     Drug use: No    Sexual activity: Not on file   Other Topics Concern    Not on file   Social History Narrative    Not on file     Social Determinants of Health     Financial Resource Strain: Not on file   Food Insecurity: Not on file   Transportation Needs: Not on file   Physical Activity: Not on file   Stress: Not on file   Social Connections: Not on file   Intimate Partner Violence: Not on file   Housing Stability: Not on file     No family history on file. Review of Systems:     Skin: (-) rash,(-) psoriasis,(-) eczema, (-)skin cancer. Musculoskeletal: (-) fractures,  (-) dislocations,(-) collagen vascular disease, (-) fibromyalgia, (-) multiple sclerosis, (-) muscular dystrophy, (-) RSD,(-) joint pain (-)swelling, (-) joint pain,swelling. Neurologic: (-) epilepsy, (-)seizures,(-) brain tumor,(-) TIA, (-)stroke, (-)headaches, (-)Parkinson disease,(-) memory loss, (-) LOC. Cardiovascular: (-) Chest pain, (-) swelling in legs/feet, (-) SOB, (-) cramping in legs/feet with walking. Constitutional:  The patient is alert and oriented x 3, appears to be stated age and in no distress. Temp 98 °F (36.7 °C)   Wt 188 lb (85.3 kg)   LMP 07/07/2017   BMI 29.44 kg/m²     Skin:  Upon inspection: the skin appears warm, dry and intact. There is not a previous scar over the affected area. There is not any cellulitis, lymphedema or cutaneous lesions noted in the lower extremities. Upon palpation there is no induration noted. Neurologic:  Gait: normal;  Motor exam of the lower extremities show ; quadriceps, hamstrings, foot dorsi and plantar flexors intact R.  5/5 and L. 5/5.  Deep tendon reflexes are 2/4 at the knees and 2/4 at the ankles with strong extensor hallicus longus motor strength bilaterally. Sensory to both feet is intact to all sensory roots. Cardiovascular: The vascular exam is normal and is well perfused to distal extremities. Distal pulses DP/PT: R. 2+; L. 2+. There is cap refill noted less than two seconds in all digits. There is not edema of the bilateral lower extremities. There is not varicosities noted in the distal extremities. Lymph:  Upon palpation,  there is no lymphadenopathy noted in bilateral lower extremities. Musculoskeletal:  Gait: antalgic; examination of the nails and digits reveal no cyanosis or clubbing    Lumbar exam:  On visual inspection, there is no deformity of the spine. full range of motion, no tenderness, palpable spasm or pain on motion. Special tests: Straight Leg Raise negative, Leila testnegative. Hip exam:  Upon inspection, there is no deformity noted. Upon palpation there is not tenderness. ROM: is   full and semetrical.   Strength: Hip Flexors 5/5; Hip Abductors 5/5; Hip Adduction 5/5. Knee exam:  Right knee exam shows;  range of motion of R. Knee is 0 to 115, and L. Knee is 0 to 115. She does have  pain on motion, effusion is mild, there is tenderness over the  medial region, there are not any masses, there is not ligamentous instability, there is  deformity noted. Knee exam: right positive for moderate crepitations, some mild tenderness laxity is not noted with stress.       R. Knee:  Lachman's negative, Anterior Drawer negative, Posterior Drawer negative  Gama's negative, Thallasy  negative,   PF grind test negative, Apprehension test negative, Patellar J sign  negative  L. Knee:  Lachman's negative, Anterior Drawer negative, Posterior Drawer negative  Gama's negative, Thallasy  negative,   PF grind test negative, Apprehension test negative,  Patellar J sign  negative    Xrays:     Right TKA with small stable knee joint effusion. MRI:   Not performed  Radiographic findings reviewed with patient    Impression:  Encounter Diagnosis   Name Primary? S/P total knee arthroplasty, right Yes       Plan:   Natural history and expected course discussed. Questions answered. Educational materials distributed. Rest, ice, compression, and elevation (RICE) therapy. Reduction in offending activity. Patellar compression sleeve. Patient will continue with activities as tolerated. I will see her back in 3 months.

## 2023-02-20 ENCOUNTER — OFFICE VISIT (OUTPATIENT)
Dept: ORTHOPEDIC SURGERY | Age: 53
End: 2023-02-20
Payer: COMMERCIAL

## 2023-02-20 VITALS — WEIGHT: 188 LBS | BODY MASS INDEX: 29.51 KG/M2 | TEMPERATURE: 98 F | HEIGHT: 67 IN

## 2023-02-20 DIAGNOSIS — Z96.651 S/P TOTAL KNEE ARTHROPLASTY, RIGHT: Primary | ICD-10-CM

## 2023-02-20 PROCEDURE — 99213 OFFICE O/P EST LOW 20 MIN: CPT | Performed by: ORTHOPAEDIC SURGERY

## 2023-02-20 RX ORDER — GABAPENTIN 100 MG/1
100 CAPSULE ORAL 3 TIMES DAILY
Qty: 90 CAPSULE | Refills: 2 | Status: SHIPPED | OUTPATIENT
Start: 2023-02-20 | End: 2023-05-21

## 2023-02-20 NOTE — PROGRESS NOTES
Chief Complaint   Patient presents with    Knee Pain     Right TKA DOS 05/26/2022, some relief with the Naprosyn. States of some swelling. Callie Velez returns today for follow-up of her right knee pain. she reports this is unchanged than when I saw her last.  The patient's pain level is a 1-2/10. The previous treatment was successful. Past Medical History:   Diagnosis Date    Arthritis     GERD (gastroesophageal reflux disease)     Hiatal hernia      Past Surgical History:   Procedure Laterality Date    ABDOMINOPLASTY  02/2017    CHOLECYSTECTOMY      COLONOSCOPY      ENDOSCOPY, COLON, DIAGNOSTIC      HERNIA REPAIR      HYSTERECTOMY (CERVIX STATUS UNKNOWN)      OTHER SURGICAL HISTORY  07/12/2017    laparscopic total hysterectomy robotic assissted with bilateral salpingectomy and cystoscopy    OVARY REMOVAL      one ovary removed not sure if rt or lt    TOTAL KNEE ARTHROPLASTY Right 05/26/2022    RIGHT KNEE TOTAL ARTHROPLASTY ([place] [facility]) performed by Jim Nguyen DO at [address]       Current Outpatient Medications:     diclofenac sodium (VOLTAREN) 1 % GEL, Apply 4 g topically 4 times daily, Disp: 240 g, Rfl: 3    cephALEXin (KEFLEX) 500 MG capsule, Take 4 capsules by mouth daily as needed (take 2 g, 1 hour prior to dental procedure), Disp: 4 capsule, Rfl: 0    celecoxib (CELEBREX) 100 MG capsule, Take 1 capsule by mouth 2 times daily, Disp: 60 capsule, Rfl: 0    NONFORMULARY, Vitamin A, D, E, C., Disp: , Rfl:     vitamin D (ERGOCALCIFEROL) 1.25 MG (92491 UT) CAPS capsule, , Disp: , Rfl:     gabapentin (NEURONTIN) 100 MG capsule, Take 1 capsule by mouth 3 times daily for 30 days. , Disp: 90 capsule, Rfl: 3    naproxen (NAPROSYN) 500 MG tablet, Take 1 tablet by mouth 2 times daily (with meals), Disp: 60 tablet, Rfl: 3    naproxen (NAPROSYN) 500 MG tablet, Take 1 tablet by mouth 2 times daily (with meals), Disp: 60 tablet, Rfl: 3    celecoxib (CELEBREX) 200 MG capsule, Take 1 capsule by mouth 2 times daily, Disp: 60 capsule, Rfl: 0  No Known Allergies  Social History     Socioeconomic History    Marital status:      Spouse name: Not on file    Number of children: Not on file    Years of education: Not on file    Highest education level: Not on file   Occupational History    Not on file   Tobacco Use    Smoking status: Never    Smokeless tobacco: Never   Substance and Sexual Activity    Alcohol use: Yes     Comment: occasional     Drug use: No    Sexual activity: Not on file   Other Topics Concern    Not on file   Social History Narrative    Not on file     Social Determinants of Health     Financial Resource Strain: Not on file   Food Insecurity: Not on file   Transportation Needs: Not on file   Physical Activity: Not on file   Stress: Not on file   Social Connections: Not on file   Intimate Partner Violence: Not on file   Housing Stability: Not on file     No family history on file. Review of Systems:     Skin: (-) rash,(-) psoriasis,(-) eczema, (-)skin cancer. Musculoskeletal: (-) fractures,  (-) dislocations,(-) collagen vascular disease, (-) fibromyalgia, (-) multiple sclerosis, (-) muscular dystrophy, (-) RSD,(-) joint pain (-)swelling, (-) joint pain,swelling. Neurologic: (-) epilepsy, (-)seizures,(-) brain tumor,(-) TIA, (-)stroke, (-)headaches, (-)Parkinson disease,(-) memory loss, (-) LOC. Cardiovascular: (-) Chest pain, (-) swelling in legs/feet, (-) SOB, (-) cramping in legs/feet with walking. Constitutional:  The patient is alert and oriented x 3, appears to be stated age and in no distress. Temp 98 °F (36.7 °C)   Ht 5' 7\" (1.702 m)   Wt 188 lb (85.3 kg)   LMP 07/07/2017   BMI 29.44 kg/m²     Skin:  Upon inspection: the skin appears warm, dry and intact. There is not a previous scar over the affected area. There is not any cellulitis, lymphedema or cutaneous lesions noted in the lower extremities. Upon palpation there is no induration noted.       Neurologic:  Gait: normal;  Motor exam of the lower extremities show ; quadriceps, hamstrings, foot dorsi and plantar flexors intact R.  5/5 and L. 5/5. Deep tendon reflexes are 2/4 at the knees and 2/4 at the ankles with strong extensor hallicus longus motor strength bilaterally. Sensory to both feet is intact to all sensory roots. Cardiovascular: The vascular exam is normal and is well perfused to distal extremities. Distal pulses DP/PT: R. 2+; L. 2+. There is cap refill noted less than two seconds in all digits. There is not edema of the bilateral lower extremities. There is not varicosities noted in the distal extremities. Lymph:  Upon palpation,  there is no lymphadenopathy noted in bilateral lower extremities. Musculoskeletal:  Gait: antalgic; examination of the nails and digits reveal no cyanosis or clubbing    Lumbar exam:  On visual inspection, there is no deformity of the spine. full range of motion, no tenderness, palpable spasm or pain on motion. Special tests: Straight Leg Raise negative, Leila testnegative. Hip exam:  Upon inspection, there is no deformity noted. Upon palpation there is not tenderness. ROM: is   full and semetrical.   Strength: Hip Flexors 5/5; Hip Abductors 5/5; Hip Adduction 5/5. Knee exam:  Right knee exam shows;  range of motion of R. Knee is 0 to 115, and L. Knee is 0 to 120. She does not have  pain on motion, effusion is none, there is tenderness over the  anterior region, there are not any masses, there is not ligamentous instability, there is  deformity noted. Knee exam: right positive for moderate crepitations, some mild tenderness laxity is not noted with stress.       R. Knee:  Lachman's negative, Anterior Drawer negative, Posterior Drawer negative  Gama's negative, Thallasy  negative,   PF grind test negative, Apprehension test negative, Patellar J sign  negative  L. Knee:  Lachman's negative, Anterior Drawer negative, Posterior Drawer negative  Gama's negative, Thallasy  negative,   PF grind test negative, Apprehension test negative,  Patellar J sign  negative    Xrays:   Not performed today. MRI:   Not performed today. Radiographic findings reviewed with patient    Impression:  Encounter Diagnosis   Name Primary? S/P total knee arthroplasty, right Yes       Plan:   Natural history and expected course discussed. Questions answered. Educational materials distributed. Rest, ice, compression, and elevation (RICE) therapy. Reduction in offending activity. Patellar compression sleeve. She will continue with activities as tolerated. I will order Neurontin 100mg TID for the next month and Naprosyn. I will see her back in 2 months.

## 2023-03-13 ENCOUNTER — TELEPHONE (OUTPATIENT)
Dept: ORTHOPEDIC SURGERY | Age: 53
End: 2023-03-13

## 2023-03-13 RX ORDER — CEPHALEXIN 500 MG/1
2000 CAPSULE ORAL DAILY PRN
Qty: 4 CAPSULE | Refills: 5 | Status: SHIPPED | OUTPATIENT
Start: 2023-03-13

## 2023-03-13 NOTE — TELEPHONE ENCOUNTER
Patient is going to the dentist today and needs antibiotics      Last appointment 2/20/2023  Next appointment   Future Appointments   Date Time Provider Sujatha Lopes   5/22/2023  4:00  - [address]      Last refill   DOS:  5/26/22      Patient called in requesting refill of     cephALEXin (Jose Miguel Ramirez) 500 MG capsule [[id number]]      [facility], [address] [phone] Salvatore Calderón [phone]   [address] [place] [address] [place], [facility] [address] [address]   Phone:  [phone]  Fax:  [phone]

## 2023-05-17 DIAGNOSIS — Z96.651 S/P TOTAL KNEE ARTHROPLASTY, RIGHT: Primary | ICD-10-CM

## 2023-05-22 ENCOUNTER — OFFICE VISIT (OUTPATIENT)
Dept: ORTHOPEDIC SURGERY | Age: 53
End: 2023-05-22
Payer: COMMERCIAL

## 2023-05-22 VITALS — TEMPERATURE: 98 F | HEIGHT: 67 IN | WEIGHT: 188 LBS | BODY MASS INDEX: 29.51 KG/M2

## 2023-05-22 DIAGNOSIS — Z96.651 S/P TOTAL KNEE ARTHROPLASTY, RIGHT: Primary | ICD-10-CM

## 2023-05-22 PROCEDURE — 99213 OFFICE O/P EST LOW 20 MIN: CPT | Performed by: ORTHOPAEDIC SURGERY

## 2023-06-05 ENCOUNTER — EVALUATION (OUTPATIENT)
Dept: PHYSICAL THERAPY | Age: 53
End: 2023-06-05
Payer: COMMERCIAL

## 2023-06-05 DIAGNOSIS — Z96.651 S/P TOTAL KNEE ARTHROPLASTY, RIGHT: Primary | ICD-10-CM

## 2023-06-05 PROCEDURE — 97163 PT EVAL HIGH COMPLEX 45 MIN: CPT | Performed by: PHYSICAL THERAPIST

## 2023-06-05 NOTE — PROGRESS NOTES
Physical Therapy Daily Treatment Note    Date: 2023  Patient Name: Kirsten Mata  (Nee-Tra)  : 1970   MRN: [id number]  DOInjury: --   DOSx: 22  Referring Provider: Daniel Parra DO  [address] [facility]:      Diagnosis Orders   1. S/P total knee arthroplasty, right          Ephriam Rakes demonstrates significant R LE weakness along with sensitivity to palpation at anterior knee. A single leg strengthening regimen will be used to develop total power and strength as well as reduce anterior pain via adaptation to loading. Desensitization exercises will be added as helpful. X = TO BE PERFORMED NEXT VISIT  > = PROGRESS TO THIS    S: See eval  O:   Time 2984-6502     Visit - Repeat outcome measure at mid point and end. Pain Pain 1-2/10     ROM good     Modalities      VASOCOMPRESSION X  MO   Exercise      Bike ?, prn     Leg Press 1-leg X  TE   Hamstring Curl Machine X  TE   Knee Extension Machine X  TE   Squats with progression to [address] X  TA   Calf Raises - single leg X  TA   Single leg dead lift >  TA      TA               A:  Tolerated well. Discussed anatomy, physiology, body mechanics, principles of loading, and progressive loading/activity.     P: Continue with rehab plan  Kumar Naylor PT    Treatment Charges: Mins Units   Initial Evaluation 45 1   Re-Evaluation     Ther Exercise         TE     Manual Therapy     MT     Ther Activities        TA     Gait Training          GT     Neuro Re-education NR     Modalities     Non-Billable Service Time     Other     Total Time/Units 45 1
ABDOMINOPLASTY  02/2017    CHOLECYSTECTOMY      COLONOSCOPY      ENDOSCOPY, COLON, DIAGNOSTIC      HERNIA REPAIR      HYSTERECTOMY (CERVIX STATUS UNKNOWN)      OTHER SURGICAL HISTORY  07/12/2017    laparscopic total hysterectomy robotic assissted with bilateral salpingectomy and cystoscopy    OVARY REMOVAL      one ovary removed not sure if rt or lt    TOTAL KNEE ARTHROPLASTY Right 05/26/2022    RIGHT KNEE TOTAL ARTHROPLASTY [facility]) performed by Daniel Parra DO at [address]       Medications:   Current Outpatient Medications   Medication Sig Dispense Refill    cephALEXin (KEFLEX) 500 MG capsule Take 4 capsules by mouth daily as needed (take 2 g, 1 hour prior to dental procedure) 4 capsule 5    gabapentin (NEURONTIN) 100 MG capsule Take 1 capsule by mouth 3 times daily for 90 days. 90 capsule 2    gabapentin (NEURONTIN) 100 MG capsule Take 1 capsule by mouth 3 times daily for 30 days. 90 capsule 3    naproxen (NAPROSYN) 500 MG tablet Take 1 tablet by mouth 2 times daily (with meals) 60 tablet 3    naproxen (NAPROSYN) 500 MG tablet Take 1 tablet by mouth 2 times daily (with meals) 60 tablet 3    diclofenac sodium (VOLTAREN) 1 % GEL Apply 4 g topically 4 times daily 240 g 3    cephALEXin (KEFLEX) 500 MG capsule Take 4 capsules by mouth daily as needed (take 2 g, 1 hour prior to dental procedure) 4 capsule 0    celecoxib (CELEBREX) 200 MG capsule Take 1 capsule by mouth 2 times daily 60 capsule 0    celecoxib (CELEBREX) 100 MG capsule Take 1 capsule by mouth 2 times daily 60 capsule 0    NONFORMULARY Vitamin A, D, E, C.      vitamin D (ERGOCALCIFEROL) 1.25 MG (51937 UT) CAPS capsule        No current facility-administered medications for this visit. Occupation: [employer]; .      Exercise regimen: none - likes to walk, but unable to walk long distances     Hobbies: working around the house    Patient Goals: walk long distances and use leg without swelling and

## 2023-06-07 ENCOUNTER — TREATMENT (OUTPATIENT)
Dept: PHYSICAL THERAPY | Age: 53
End: 2023-06-07
Payer: COMMERCIAL

## 2023-06-07 DIAGNOSIS — Z96.651 S/P TOTAL KNEE ARTHROPLASTY, RIGHT: Primary | ICD-10-CM

## 2023-06-07 DIAGNOSIS — Z96.651 STATUS POST TOTAL RIGHT KNEE REPLACEMENT: ICD-10-CM

## 2023-06-07 PROCEDURE — 97110 THERAPEUTIC EXERCISES: CPT

## 2023-06-07 PROCEDURE — 97016 VASOPNEUMATIC DEVICE THERAPY: CPT

## 2023-06-07 NOTE — PROGRESS NOTES
Physical Therapy Daily Treatment Note    Date: 2023  Patient Name: Ricardo Cabral  (Nee-Tra)  : 1970   MRN: [id number]  DOInjury: --   DOSx: 22  Referring Provider: Al Farrell, DO  [address] [facility] Diagnosis:      Diagnosis Orders   1. S/P total knee arthroplasty, right        2. Status post total right knee replacement            Erick Salazar demonstrates significant R LE weakness along with sensitivity to palpation at anterior knee. A single leg strengthening regimen will be used to develop total power and strength as well as reduce anterior pain via adaptation to loading. Desensitization exercises will be added as helpful. X = TO BE PERFORMED NEXT VISIT  > = PROGRESS TO THIS    S: Pt reports knee feels really good today. O:   Time 0597-2688     Visit   ref # [id number]  no copay  visits- 20 combined OT/PT- hard max  no pre auth  covered at 80% after deductible Repeat outcome measure at mid point and end. Pain Pain 1-2/10     ROM good     Modalities      VASOCOMPRESSION X 15 min  MO   Exercise      Bike ?, prn     Leg Press 1-leg 20# 3 x 10  TE   Hamstring Curl Machine 20# 2 x 10  TE   Knee Extension Machine 5# 3 x 10  TE   Squats with progression to [address] X  TA   Calf Raises - single leg 2 x 10  TA   Single leg dead lift >  TA      TA               A:  Tolerated well. Discussed anatomy, physiology, body mechanics, principles of loading, and progressive loading/activity.     P: Continue with rehab plan  Pérez Dill PTA    Treatment Charges: Mins Units   Initial Evaluation     Re-Evaluation     Ther Exercise         TE 30 2   Manual Therapy     MT     Ther Activities        TA     Gait Training          GT     Neuro Re-education NR     Modalities 15 1   Non-Billable Service Time     Other     Total Time/Units 45 3

## 2023-06-21 ENCOUNTER — TREATMENT (OUTPATIENT)
Dept: PHYSICAL THERAPY | Age: 53
End: 2023-06-21

## 2023-06-21 DIAGNOSIS — Z96.651 STATUS POST TOTAL RIGHT KNEE REPLACEMENT: ICD-10-CM

## 2023-06-21 DIAGNOSIS — Z96.651 S/P TOTAL KNEE ARTHROPLASTY, RIGHT: Primary | ICD-10-CM

## 2023-06-21 NOTE — PROGRESS NOTES
Physical Therapy Daily Treatment Note    Date: 2023  Patient Name: Danie Hernández  (Nee-Tra)  : 1970   MRN: [id number]  DOInjury: --   DOSx: 22  Referring Provider: Miguel Arteaga DO  [address] [facility] Diagnosis:      Diagnosis Orders   1. S/P total knee arthroplasty, right        2. Status post total right knee replacement            Nory Glassing demonstrates significant R LE weakness along with sensitivity to palpation at anterior knee. A single leg strengthening regimen will be used to develop total power and strength as well as reduce anterior pain via adaptation to loading. Desensitization exercises will be added as helpful. X = TO BE PERFORMED NEXT VISIT  > = PROGRESS TO THIS    S: Pt reports pain above knee cap and at times medially  O:   Time 2089-9366     Visit   ref # [id number]  no copay  visits- 20 combined OT/PT- hard max  no pre auth  covered at 80% after deductible Repeat outcome measure at mid point and end. Pain Pain 1-2/10     ROM good     Modalities      VASOCOMPRESSION  MO   Exercise      Bike Seat 5 X 10 min     Leg Press 1-leg 20# 3 x 10  TE   Hamstring Curl Machine 40# 3 x 10  TE   Knee Extension Machine 5# 3 x 10  TE   Squats with progression to [employer] 2 x 10  TA   Calf Raises - single leg 2 x 10  TA   Single leg dead lift >  TA      TA               A:  Tolerated well. Discussed anatomy, physiology, body mechanics, principles of loading, and progressive loading/activity.     P: Continue with rehab plan  Norma Kerr PTA    Treatment Charges: Mins Units   Initial Evaluation     Re-Evaluation     Ther Exercise         TE 30 2   Manual Therapy     MT     Ther Activities        TA     Gait Training          GT     Neuro Re-education NR     Modalities     Non-Billable Service Time     Other     Total Time/Units 30 2

## 2023-06-26 ENCOUNTER — TREATMENT (OUTPATIENT)
Dept: PHYSICAL THERAPY | Age: 53
End: 2023-06-26
Payer: COMMERCIAL

## 2023-06-26 DIAGNOSIS — Z96.651 S/P TOTAL KNEE ARTHROPLASTY, RIGHT: Primary | ICD-10-CM

## 2023-06-26 DIAGNOSIS — Z96.651 STATUS POST TOTAL RIGHT KNEE REPLACEMENT: ICD-10-CM

## 2023-06-26 PROCEDURE — 97110 THERAPEUTIC EXERCISES: CPT

## 2023-06-28 ENCOUNTER — TREATMENT (OUTPATIENT)
Dept: PHYSICAL THERAPY | Age: 53
End: 2023-06-28
Payer: COMMERCIAL

## 2023-06-28 DIAGNOSIS — Z96.651 S/P TOTAL KNEE ARTHROPLASTY, RIGHT: Primary | ICD-10-CM

## 2023-06-28 DIAGNOSIS — Z96.651 STATUS POST TOTAL RIGHT KNEE REPLACEMENT: ICD-10-CM

## 2023-06-28 PROCEDURE — 97530 THERAPEUTIC ACTIVITIES: CPT

## 2023-07-03 ENCOUNTER — TREATMENT (OUTPATIENT)
Dept: PHYSICAL THERAPY | Age: 53
End: 2023-07-03
Payer: COMMERCIAL

## 2023-07-03 DIAGNOSIS — Z96.651 S/P TOTAL KNEE ARTHROPLASTY, RIGHT: Primary | ICD-10-CM

## 2023-07-03 DIAGNOSIS — Z96.651 STATUS POST TOTAL RIGHT KNEE REPLACEMENT: ICD-10-CM

## 2023-07-03 PROCEDURE — 97110 THERAPEUTIC EXERCISES: CPT

## 2023-07-03 NOTE — PROGRESS NOTES
Physical Therapy Daily Treatment Note    Date: 7/3/2023  Patient Name: Nabeel Austin  (Nee-Tra)  : 1970   MRN: [id number]  DOInjury: --   DOSx: 22  Referring Provider: Shin Hussein DO  [address] [facility],  [address] [place] [address]     Medical Diagnosis:      Diagnosis Orders   1. S/P total knee arthroplasty, right        2. Status post total right knee replacement            Carmella Babcock demonstrates significant R LE weakness along with sensitivity to palpation at anterior knee. A single leg strengthening regimen will be used to develop total power and strength as well as reduce anterior pain via adaptation to loading. Desensitization exercises will be added as helpful. Access Code: [email or web address]  URL: [email or web address]  Date: 2023  Prepared by: Alban Hill    Exercises  - Standing Single Leg Heel Raise  - 3-4 x weekly - 3 sets - 10 reps  - Squat with Chair Touch  - 3-4 x weekly - 3 sets - 10 reps  - Elias Split Squat  - 3-4 x weekly - 3 sets - 10 reps  - Single Leg Hip Hinge with Counter Support  - 3-4 x weekly - 3 sets - 10 reps  - Half Deadlift with Kettlebell  - 3-4 x weekly - 3 sets - 10 reps    X = TO BE PERFORMED NEXT VISIT  > = PROGRESS TO THIS    S: Pt reports feeling good. States took grandchild to [address] [facility], ran after him in sandals and knee felt fine. States knee is at times stiff but not painful. Very happy with her knee at this time  O:   Time 2518-7795     Visit   ref # [id number]  no copay  visits- 20 combined OT/PT- hard max  no pre auth  covered at 80% after deductible Repeat outcome measure at mid point and end.     Pain Pain 1-2/10     ROM good     Modalities      VASOCOMPRESSION  MO   Exercise      Bike     Leg Press 1-leg 20# 3 x 10  TE   Hamstring Curl Machine 40# 3 x 10  TE   Knee Extension Machine 10# 3 x 10  TE   Squats with progression to [address]  TA   Calf Raises - single leg 3 x 10  TA   Single leg dead lift  TA      TA               A:

## 2023-07-19 DIAGNOSIS — Z96.651 S/P TOTAL KNEE ARTHROPLASTY, RIGHT: Primary | ICD-10-CM

## 2023-07-24 ENCOUNTER — OFFICE VISIT (OUTPATIENT)
Dept: ORTHOPEDIC SURGERY | Age: 53
End: 2023-07-24
Payer: COMMERCIAL

## 2023-07-24 VITALS — TEMPERATURE: 98 F | WEIGHT: 188 LBS | BODY MASS INDEX: 29.51 KG/M2 | HEIGHT: 67 IN

## 2023-07-24 DIAGNOSIS — Z96.651 S/P TOTAL KNEE ARTHROPLASTY, RIGHT: Primary | ICD-10-CM

## 2023-07-24 PROCEDURE — 99213 OFFICE O/P EST LOW 20 MIN: CPT | Performed by: ORTHOPAEDIC SURGERY

## 2023-07-24 NOTE — PROGRESS NOTES
tenderness laxity is not noted with stress. R. Knee:  Lachman's negative, Anterior Drawer negative, Posterior Drawer negative  Gama's negative, Thallasy  negative,   PF grind test negative, Apprehension test negative, Patellar J sign  negative  L. Knee:  Lachman's negative, Anterior Drawer negative, Posterior Drawer negative  Gama's negative, Thallasy  negative,   PF grind test negative, Apprehension test negative,  Patellar J sign  negative    Xrays:   S/p tka with no signs of loosening    MRI:   Not performed today. Radiographic findings reviewed with patient    Impression:  Encounter Diagnosis   Name Primary? S/P total knee arthroplasty, right Yes         Plan:   Natural history and expected course discussed. Questions answered. Educational materials distributed. Rest, ice, compression, and elevation (RICE) therapy. Reduction in offending activity. Patellar compression sleeve. Continue with ABX protocol. Continue with Gabapentin as needed. Continue to exercise.   Follow up in one years

## 2023-10-22 NOTE — PROGRESS NOTES
[facility]     Patient presents for annual exam.  Complains of a vaginal discharge. Was on antibiotics recently. Has had the discharge for about a week. No bloating cramping abdominal pain. No spotting or bleeding. No changes in her urinary or bowel function. Has chronic constipation. Is up-to-date on her colonoscopies. Use of Metamucil, and increasing the fiber in the diet reviewed with her. Past Medical History:   Diagnosis Date    Arthritis     GERD (gastroesophageal reflux disease)     Hiatal hernia         Past Surgical History:   Procedure Laterality Date    ABDOMINOPLASTY  02/2017    CHOLECYSTECTOMY      COLONOSCOPY      ENDOSCOPY, COLON, DIAGNOSTIC      HERNIA REPAIR      LAPAROSCOPIC HYSTERECTOMY      OTHER SURGICAL HISTORY  07/12/2017    laparscopic total hysterectomy robotic assissted with bilateral salpingectomy and cystoscopy    TOTAL KNEE ARTHROPLASTY Right 05/26/2022    RIGHT KNEE TOTAL ARTHROPLASTY [facility]) performed by Destiney Pinto DO at [facility]        No family history on file. Current Outpatient Medications:     phentermine 37.5 MG capsule, Take by mouth., Disp: , Rfl:     fluconazole (DIFLUCAN) 150 MG tablet, Take 1 tablet by mouth every 72 hours for 3 doses, Disp: 3 tablet, Rfl: 0    NONFORMULARY, Vitamin A, D, E, C., Disp: , Rfl:     vitamin D (ERGOCALCIFEROL) 1.25 MG (56186 UT) CAPS capsule, , Disp: , Rfl:     cephALEXin (KEFLEX) 500 MG capsule, Take 4 capsules by mouth daily as needed (take 2 g, 1 hour prior to dental procedure) (Patient not taking: Reported on 10/25/2023), Disp: 4 capsule, Rfl: 5    gabapentin (NEURONTIN) 100 MG capsule, Take 1 capsule by mouth 3 times daily for 90 days. , Disp: 90 capsule, Rfl: 2    gabapentin (NEURONTIN) 100 MG capsule, Take 1 capsule by mouth 3 times daily for 30 days. , Disp: 90 capsule, Rfl: 3    naproxen (NAPROSYN) 500 MG tablet, Take 1 tablet by mouth 2 times daily (with meals), Disp: 60 tablet, Rfl: 3    naproxen

## 2023-10-25 ENCOUNTER — OFFICE VISIT (OUTPATIENT)
Age: 53
End: 2023-10-25
Payer: COMMERCIAL

## 2023-10-25 VITALS
WEIGHT: 203 LBS | SYSTOLIC BLOOD PRESSURE: 118 MMHG | BODY MASS INDEX: 30.77 KG/M2 | HEIGHT: 68 IN | DIASTOLIC BLOOD PRESSURE: 87 MMHG | HEART RATE: 103 BPM

## 2023-10-25 DIAGNOSIS — B37.31 YEAST VAGINITIS: ICD-10-CM

## 2023-10-25 DIAGNOSIS — Z12.31 BREAST CANCER SCREENING BY MAMMOGRAM: ICD-10-CM

## 2023-10-25 DIAGNOSIS — Z12.72 SCREENING FOR VAGINAL CANCER: Primary | ICD-10-CM

## 2023-10-25 PROCEDURE — 99396 PREV VISIT EST AGE 40-64: CPT | Performed by: LEGAL MEDICINE

## 2023-10-25 PROCEDURE — 99213 OFFICE O/P EST LOW 20 MIN: CPT | Performed by: LEGAL MEDICINE

## 2023-10-25 RX ORDER — PHENTERMINE HYDROCHLORIDE 37.5 MG/1
CAPSULE ORAL
COMMUNITY
Start: 2023-10-09

## 2023-10-25 RX ORDER — FLUCONAZOLE 150 MG/1
150 TABLET ORAL
Qty: 3 TABLET | Refills: 0 | Status: SHIPPED | OUTPATIENT
Start: 2023-10-25 | End: 2023-11-01

## 2023-10-25 SDOH — ECONOMIC STABILITY: FOOD INSECURITY: WITHIN THE PAST 12 MONTHS, YOU WORRIED THAT YOUR FOOD WOULD RUN OUT BEFORE YOU GOT MONEY TO BUY MORE.: NEVER TRUE

## 2023-10-25 SDOH — ECONOMIC STABILITY: INCOME INSECURITY: HOW HARD IS IT FOR YOU TO PAY FOR THE VERY BASICS LIKE FOOD, HOUSING, MEDICAL CARE, AND HEATING?: NOT HARD AT ALL

## 2023-10-25 SDOH — ECONOMIC STABILITY: FOOD INSECURITY: WITHIN THE PAST 12 MONTHS, THE FOOD YOU BOUGHT JUST DIDN'T LAST AND YOU DIDN'T HAVE MONEY TO GET MORE.: NEVER TRUE

## 2023-10-25 SDOH — ECONOMIC STABILITY: HOUSING INSECURITY
IN THE LAST 12 MONTHS, WAS THERE A TIME WHEN YOU DID NOT HAVE A STEADY PLACE TO SLEEP OR SLEPT IN A SHELTER (INCLUDING NOW)?: NO

## 2023-10-25 ASSESSMENT — ENCOUNTER SYMPTOMS
RECTAL PAIN: 0
VOMITING: 0
NAUSEA: 0
ABDOMINAL PAIN: 0
BLOOD IN STOOL: 0
CONSTIPATION: 1
DIARRHEA: 0
ABDOMINAL DISTENTION: 0

## 2023-10-25 ASSESSMENT — PATIENT HEALTH QUESTIONNAIRE - PHQ9
2. FEELING DOWN, DEPRESSED OR HOPELESS: 0
SUM OF ALL RESPONSES TO PHQ QUESTIONS 1-9: 0
1. LITTLE INTEREST OR PLEASURE IN DOING THINGS: 0
SUM OF ALL RESPONSES TO PHQ9 QUESTIONS 1 & 2: 0
SUM OF ALL RESPONSES TO PHQ QUESTIONS 1-9: 0

## 2023-10-25 NOTE — PATIENT INSTRUCTIONS
To reduce the risk of colon cancer, and to improve your bowel movements, work towards ingesting 30 g of fiber per day.

## 2023-10-30 LAB — GYNECOLOGY CYTOLOGY REPORT: NORMAL

## 2023-11-28 ENCOUNTER — TELEPHONE (OUTPATIENT)
Age: 53
End: 2023-11-28

## 2023-11-28 ENCOUNTER — HOSPITAL ENCOUNTER (OUTPATIENT)
Dept: GENERAL RADIOLOGY | Age: 53
Discharge: HOME | End: 2023-11-30
Attending: LEGAL MEDICINE
Payer: COMMERCIAL

## 2023-11-28 ENCOUNTER — TELEPHONE (OUTPATIENT)
Dept: GENERAL RADIOLOGY | Age: 53
End: 2023-11-28

## 2023-11-28 VITALS — WEIGHT: 203 LBS | HEIGHT: 67 IN | BODY MASS INDEX: 31.86 KG/M2

## 2023-11-28 DIAGNOSIS — Z12.31 BREAST CANCER SCREENING BY MAMMOGRAM: ICD-10-CM

## 2023-11-28 DIAGNOSIS — R92.8 ABNORMAL MAMMOGRAM: Primary | ICD-10-CM

## 2023-11-28 PROCEDURE — 77063 BREAST TOMOSYNTHESIS BI: CPT

## 2023-11-28 NOTE — TELEPHONE ENCOUNTER
----- Message from Hossein Reich MD sent at 11/28/2023  8:52 AM EST -----  Please call patient. 11/28/2023    Report shows abnormal mammogram.  Radiologist is recommending bilateral breast ultrasounds. Order for this has been placed in epic.               Thank you
Pt notified, voiced understanding.
no
no

## 2023-11-28 NOTE — RESULT ENCOUNTER NOTE
Please call patient. 11/28/2023    Report shows abnormal mammogram.  Radiologist is recommending bilateral breast ultrasounds. Order for this has been placed in epic.               Thank you

## 2023-11-28 NOTE — TELEPHONE ENCOUNTER
Call to patient in reference to her mammogram performed at [facility] on November 28, 2023. Instructed patient that the radiologist has recommended some additional breast imaging in order to make a final determination/result. Informed her that a Rx has been obtained by the ordering physician. Next, a  from [facility] will contact her to schedule the additional imaging study/studies. Verbalizes understanding and is agreeable to proceed.

## 2024-10-30 ENCOUNTER — OFFICE VISIT (OUTPATIENT)
Age: 54
End: 2024-10-30
Payer: COMMERCIAL

## 2024-10-30 VITALS
HEART RATE: 83 BPM | SYSTOLIC BLOOD PRESSURE: 122 MMHG | BODY MASS INDEX: 31.62 KG/M2 | DIASTOLIC BLOOD PRESSURE: 84 MMHG | OXYGEN SATURATION: 95 % | WEIGHT: 201.9 LBS

## 2024-10-30 DIAGNOSIS — Z96.651 S/P TOTAL KNEE ARTHROPLASTY, RIGHT: Primary | ICD-10-CM

## 2024-10-30 DIAGNOSIS — Z01.419 ENCOUNTER FOR GYNECOLOGICAL EXAMINATION WITHOUT ABNORMAL FINDING: Primary | ICD-10-CM

## 2024-10-30 PROCEDURE — 99396 PREV VISIT EST AGE 40-64: CPT | Performed by: OBSTETRICS & GYNECOLOGY

## 2024-10-30 PROCEDURE — 99459 PELVIC EXAMINATION: CPT | Performed by: OBSTETRICS & GYNECOLOGY

## 2024-10-30 RX ORDER — CEPHALEXIN 500 MG/1
2000 CAPSULE ORAL DAILY PRN
Qty: 4 CAPSULE | Refills: 3 | Status: SHIPPED | OUTPATIENT
Start: 2024-10-30

## 2024-10-30 SDOH — ECONOMIC STABILITY: FOOD INSECURITY: WITHIN THE PAST 12 MONTHS, YOU WORRIED THAT YOUR FOOD WOULD RUN OUT BEFORE YOU GOT MONEY TO BUY MORE.: NEVER TRUE

## 2024-10-30 SDOH — ECONOMIC STABILITY: INCOME INSECURITY: HOW HARD IS IT FOR YOU TO PAY FOR THE VERY BASICS LIKE FOOD, HOUSING, MEDICAL CARE, AND HEATING?: NOT HARD AT ALL

## 2024-10-30 SDOH — ECONOMIC STABILITY: FOOD INSECURITY: WITHIN THE PAST 12 MONTHS, THE FOOD YOU BOUGHT JUST DIDN'T LAST AND YOU DIDN'T HAVE MONEY TO GET MORE.: NEVER TRUE

## 2024-10-30 ASSESSMENT — PATIENT HEALTH QUESTIONNAIRE - PHQ9
10. IF YOU CHECKED OFF ANY PROBLEMS, HOW DIFFICULT HAVE THESE PROBLEMS MADE IT FOR YOU TO DO YOUR WORK, TAKE CARE OF THINGS AT HOME, OR GET ALONG WITH OTHER PEOPLE: SOMEWHAT DIFFICULT
5. POOR APPETITE OR OVEREATING: NOT AT ALL
9. THOUGHTS THAT YOU WOULD BE BETTER OFF DEAD, OR OF HURTING YOURSELF: NOT AT ALL
4. FEELING TIRED OR HAVING LITTLE ENERGY: NEARLY EVERY DAY
6. FEELING BAD ABOUT YOURSELF - OR THAT YOU ARE A FAILURE OR HAVE LET YOURSELF OR YOUR FAMILY DOWN: NOT AT ALL
2. FEELING DOWN, DEPRESSED OR HOPELESS: NOT AT ALL
SUM OF ALL RESPONSES TO PHQ QUESTIONS 1-9: 9
7. TROUBLE CONCENTRATING ON THINGS, SUCH AS READING THE NEWSPAPER OR WATCHING TELEVISION: NOT AT ALL
3. TROUBLE FALLING OR STAYING ASLEEP: NEARLY EVERY DAY
SUM OF ALL RESPONSES TO PHQ QUESTIONS 1-9: 9
SUM OF ALL RESPONSES TO PHQ9 QUESTIONS 1 & 2: 3
8. MOVING OR SPEAKING SO SLOWLY THAT OTHER PEOPLE COULD HAVE NOTICED. OR THE OPPOSITE, BEING SO FIGETY OR RESTLESS THAT YOU HAVE BEEN MOVING AROUND A LOT MORE THAN USUAL: NOT AT ALL
1. LITTLE INTEREST OR PLEASURE IN DOING THINGS: NEARLY EVERY DAY
SUM OF ALL RESPONSES TO PHQ QUESTIONS 1-9: 9
SUM OF ALL RESPONSES TO PHQ QUESTIONS 1-9: 9

## 2024-10-30 NOTE — PROGRESS NOTES
Pt presents for annual exam.  Last pap 10/25/23  Mammogram orders needed.  Hx, medications and pharmacy reviewed with pt.  No other concerns at this time.   
taking: Reported on 10/25/2023) 4 capsule 0    celecoxib (CELEBREX) 200 MG capsule Take 1 capsule by mouth 2 times daily (Patient not taking: Reported on 10/30/2024) 60 capsule 0    celecoxib (CELEBREX) 100 MG capsule Take 1 capsule by mouth 2 times daily (Patient not taking: Reported on 10/25/2023) 60 capsule 0    NONFORMULARY Vitamin A, D, E, C. (Patient not taking: Reported on 10/30/2024)      vitamin D (ERGOCALCIFEROL) 1.25 MG (43332 UT) CAPS capsule  (Patient not taking: Reported on 10/30/2024)       No current facility-administered medications on file prior to visit.     No Known Allergies    Review of Systems  A comprehensive review of systems was negative.      Objective:      /84   Pulse 83   Wt 91.6 kg (201 lb 14.4 oz)   LMP 07/07/2017   SpO2 95%   BMI 31.62 kg/m²   General appearance: alert, appears stated age, and cooperative  Head: Normocephalic, without obvious abnormality, atraumatic  Eyes: conjunctivae/corneas clear. PERRL, EOM's intact. Fundi benign.  Ears: normal TM's and external ear canals both ears  Nose: Nares normal. Septum midline. Mucosa normal. No drainage or sinus tenderness.  Throat: lips, mucosa, and tongue normal; teeth and gums normal  Neck: no adenopathy, no carotid bruit, no JVD, supple, symmetrical, trachea midline, and thyroid not enlarged, symmetric, no tenderness/mass/nodules  Back: symmetric, no curvature. ROM normal. No CVA tenderness.  Lungs: clear to auscultation bilaterally  Breasts: normal appearance, no masses or tenderness  Heart: regular rate and rhythm, S1, S2 normal, no murmur, click, rub or gallop  Abdomen: soft, non-tender; bowel sounds normal; no masses,  no organomegaly  Pelvic: external genitalia normal, no adnexal masses or tenderness, rectovaginal septum normal, vagina normal without discharge, status post hysterectomy remotely.  Pelvic organs surgically absent.  No other abnormalities noted on exam.  Extremities: extremities normal, atraumatic, no

## 2025-01-02 ENCOUNTER — HOSPITAL ENCOUNTER (OUTPATIENT)
Dept: GENERAL RADIOLOGY | Age: 55
Discharge: HOME | End: 2025-01-04
Attending: OBSTETRICS & GYNECOLOGY
Payer: COMMERCIAL

## 2025-01-02 DIAGNOSIS — Z01.419 ENCOUNTER FOR GYNECOLOGICAL EXAMINATION WITHOUT ABNORMAL FINDING: ICD-10-CM

## 2025-01-02 PROCEDURE — 77063 BREAST TOMOSYNTHESIS BI: CPT

## (undated) DEVICE — Z DISCONTINUED USE 2272124 DRAPE SURG XL N INVASIVE 2 LAYR DISP

## (undated) DEVICE — SOLUTION IV IRRIG POUR BRL 0.9% SODIUM CHL 2F7124

## (undated) DEVICE — GARMENT,MEDLINE,DVT,INT,CALF,MED, GEN2: Brand: MEDLINE

## (undated) DEVICE — NEPTUNE E-SEP SMOKE EVACUATION PENCIL, COATED, 70MM BLADE, PUSH BUTTON SWITCH: Brand: NEPTUNE E-SEP

## (undated) DEVICE — GAUZE,SPONGE,4"X4",16PLY,STRL,LF,10/TRAY: Brand: MEDLINE

## (undated) DEVICE — GOWN,SIRUS,POLYRNF,BRTHSLV,XLN/XL,20/CS: Brand: MEDLINE

## (undated) DEVICE — 3M™ STERI-DRAPE™ U-DRAPE 1015: Brand: STERI-DRAPE™

## (undated) DEVICE — COVER,LIGHT HANDLE,FLX,1/PK: Brand: MEDLINE INDUSTRIES, INC.

## (undated) DEVICE — PITCHER PT 1200ML W HNDL CSR WRP

## (undated) DEVICE — Z DISCONTINUED PER MEDLINE USE 2741944 DRESSING AQUACEL 12 IN SURG W9XL30CM SIL CVR WTRPRF VIR BACT BARR ANTIMIC

## (undated) DEVICE — 3M™ IOBAN™ 2 ANTIMICROBIAL INCISE DRAPE 6650EZ: Brand: IOBAN™ 2

## (undated) DEVICE — SUTURE SUTTAPE L40IN DIA1.3MM NONABSORBABLE WHT BLU L26.5MM AR7500

## (undated) DEVICE — COVER,TABLE,60X90,STERILE: Brand: MEDLINE

## (undated) DEVICE — SOLUTION SCRB 32OZ 7.5% POVIDONE IOD BTL GENTLE EFFECTIVE

## (undated) DEVICE — SET MAJOR INSTR ORTHO

## (undated) DEVICE — T4 HOOD

## (undated) DEVICE — INTENDED FOR TISSUE SEPARATION, AND OTHER PROCEDURES THAT REQUIRE A SHARP SURGICAL BLADE TO PUNCTURE OR CUT.: Brand: BARD-PARKER ® STAINLESS STEEL BLADES

## (undated) DEVICE — SPONGE LAP W18XL18IN WHT COT 4 PLY FLD STRUNG RADPQ DISP ST

## (undated) DEVICE — Z DISCONTINUED USE 2275686 GLOVE SURG SZ 8 L12IN FNGR THK13MIL WHT ISOLEX POLYISOPRENE

## (undated) DEVICE — NEEDLE SPNL L3.5IN PNK HUB S STL REG WALL FIT STYL W/ QNCKE

## (undated) DEVICE — BANDAGE COMPR L W4INXL11YD 100% COT WVN E DBL LEN CLP CLSR

## (undated) DEVICE — NDL CNTR 40CT FM MAG: Brand: MEDLINE INDUSTRIES, INC.

## (undated) DEVICE — NEEDLE FLTR 18GA L1.5IN MEM THK5UM BLNT DISP

## (undated) DEVICE — SYRINGE MED 50ML LUERLOCK TIP

## (undated) DEVICE — APPLICATOR MEDICATED 26 CC SOLUTION HI LT ORNG CHLORAPREP

## (undated) DEVICE — BANDAGE COMPR W6INXL5YD SELF ADH COHESIVE CO FLX

## (undated) DEVICE — CANNULA IV 18GA L15IN BLNT FILL LUERLOCK HUB MJCT

## (undated) DEVICE — SOLUTION IV 1000ML 0.9% SOD CHL PH 5 INJ USP VIAFLX PLAS

## (undated) DEVICE — GOWN,SIRUS,FABRNF,XL,20/CS: Brand: MEDLINE

## (undated) DEVICE — 3M™ IOBAN™ 2 ANTIMICROBIAL INCISE DRAPE 6640EZ: Brand: IOBAN™ 2

## (undated) DEVICE — STANDARD HYPODERMIC NEEDLE,POLYPROPYLENE HUB: Brand: MONOJECT

## (undated) DEVICE — PAD,ABDOMINAL,8"X10",ST,LF: Brand: MEDLINE

## (undated) DEVICE — BANDAGE COMPR W6INXL12FT SMOOTH FOR LIMB EXSANG ESMARCH

## (undated) DEVICE — MEDI-VAC YANKAUER SUCTION HANDLE: Brand: CARDINAL HEALTH

## (undated) DEVICE — PACK,EXTREMITY,ORTHOMAX,5/CS: Brand: MEDLINE

## (undated) DEVICE — SOLUTION IV IRRIG 500ML 0.9% SODIUM CHL 2F7123

## (undated) DEVICE — PADDING CAST W6INXL4YD COT LO LINTING WYTEX

## (undated) DEVICE — TUBING, SUCTION, 1/4" X 10', STRAIGHT: Brand: MEDLINE

## (undated) DEVICE — ELECTRODE PT RET AD L9FT HI MOIST COND ADH HYDRGEL CORDED

## (undated) DEVICE — MARKER,SKIN,WI/RULER AND LABELS: Brand: MEDLINE

## (undated) DEVICE — SYRINGE IRRIG 60ML SFT PLIABLE BLB EZ TO GRP 1 HND USE W/

## (undated) DEVICE — SHEET SUPPORT

## (undated) DEVICE — 3 BONE CEMENT MIXER: Brand: MIXEVAC

## (undated) DEVICE — TOWEL,OR,DSP,ST,BLUE,STD,6/PK,12PK/CS: Brand: MEDLINE

## (undated) DEVICE — HANDPIECE SET WITH BONE CLEANING TIP: Brand: INTERPULSE

## (undated) DEVICE — DOUBLE BASIN SET: Brand: MEDLINE INDUSTRIES, INC.